# Patient Record
Sex: FEMALE | Race: BLACK OR AFRICAN AMERICAN | NOT HISPANIC OR LATINO | Employment: FULL TIME | URBAN - METROPOLITAN AREA
[De-identification: names, ages, dates, MRNs, and addresses within clinical notes are randomized per-mention and may not be internally consistent; named-entity substitution may affect disease eponyms.]

---

## 2017-05-29 PROBLEM — I34.0 MITRAL VALVE INSUFFICIENCY: Status: ACTIVE | Noted: 2017-05-29

## 2017-05-29 PROBLEM — I87.2 CHRONIC VENOUS INSUFFICIENCY: Status: ACTIVE | Noted: 2017-05-29

## 2017-05-29 PROBLEM — I27.20 PULMONARY HTN: Status: RESOLVED | Noted: 2017-05-29 | Resolved: 2017-05-29

## 2017-05-29 PROBLEM — I27.20 PULMONARY HTN: Status: ACTIVE | Noted: 2017-05-29

## 2017-05-29 PROBLEM — I07.1 TRICUSPID VALVE INSUFFICIENCY: Status: ACTIVE | Noted: 2017-05-29

## 2017-05-29 PROBLEM — I42.9 CARDIOMYOPATHY: Status: ACTIVE | Noted: 2017-05-29

## 2018-07-30 PROBLEM — I50.30 HEART FAILURE WITH PRESERVED EJECTION FRACTION, BORDERLINE, CLASS II: Status: RESOLVED | Noted: 2018-07-30 | Resolved: 2018-07-30

## 2018-07-30 PROBLEM — I50.30 HEART FAILURE WITH PRESERVED EJECTION FRACTION, BORDERLINE, CLASS II: Status: ACTIVE | Noted: 2018-07-30

## 2018-12-12 PROCEDURE — 88313 SPECIAL STAINS GROUP 2: CPT | Performed by: PATHOLOGY

## 2018-12-12 PROCEDURE — 88342 IMHCHEM/IMCYTCHM 1ST ANTB: CPT | Performed by: PATHOLOGY

## 2019-09-30 PROBLEM — Z79.4 TYPE 2 DIABETES MELLITUS WITHOUT COMPLICATION, WITH LONG-TERM CURRENT USE OF INSULIN: Status: ACTIVE | Noted: 2019-09-30

## 2019-09-30 PROBLEM — T46.6X5A STATIN MYOPATHY: Status: ACTIVE | Noted: 2019-09-30

## 2019-09-30 PROBLEM — Z95.820 S/P PERIPHERAL ARTERY ANGIOPLASTY WITH STENT PLACEMENT: Status: ACTIVE | Noted: 2019-09-30

## 2019-09-30 PROBLEM — G72.0 STATIN MYOPATHY: Status: ACTIVE | Noted: 2019-09-30

## 2019-09-30 PROBLEM — I73.9 PAD (PERIPHERAL ARTERY DISEASE): Status: ACTIVE | Noted: 2019-09-30

## 2019-09-30 PROBLEM — E11.9 TYPE 2 DIABETES MELLITUS WITHOUT COMPLICATION, WITH LONG-TERM CURRENT USE OF INSULIN: Status: ACTIVE | Noted: 2019-09-30

## 2020-03-17 PROBLEM — I37.1 PULMONARY VALVE INSUFFICIENCY: Status: ACTIVE | Noted: 2020-03-17

## 2020-03-18 PROBLEM — I07.1 TRICUSPID VALVE INSUFFICIENCY: Status: RESOLVED | Noted: 2017-05-29 | Resolved: 2020-03-18

## 2020-03-18 PROBLEM — I34.0 MITRAL VALVE INSUFFICIENCY: Status: RESOLVED | Noted: 2017-05-29 | Resolved: 2020-03-18

## 2020-03-18 PROBLEM — I34.0 NONRHEUMATIC MITRAL VALVE REGURGITATION: Status: ACTIVE | Noted: 2020-03-18

## 2021-05-06 ENCOUNTER — PATIENT MESSAGE (OUTPATIENT)
Dept: RESEARCH | Facility: HOSPITAL | Age: 51
End: 2021-05-06

## 2021-05-21 PROBLEM — Z12.11 ENCOUNTER FOR COLORECTAL CANCER SCREENING: Status: ACTIVE | Noted: 2021-05-21

## 2021-05-21 PROBLEM — Z12.12 ENCOUNTER FOR COLORECTAL CANCER SCREENING: Status: ACTIVE | Noted: 2021-05-21

## 2021-06-16 ENCOUNTER — HOSPITAL ENCOUNTER (EMERGENCY)
Facility: HOSPITAL | Age: 51
Discharge: HOME | End: 2021-06-16
Attending: EMERGENCY MEDICINE
Payer: MEDICAID

## 2021-06-16 VITALS
OXYGEN SATURATION: 97 % | WEIGHT: 250 LBS | HEART RATE: 74 BPM | TEMPERATURE: 98 F | SYSTOLIC BLOOD PRESSURE: 119 MMHG | HEIGHT: 62 IN | RESPIRATION RATE: 18 BRPM | DIASTOLIC BLOOD PRESSURE: 54 MMHG | BODY MASS INDEX: 46.01 KG/M2
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
## 2021-06-16 PROCEDURE — 99284 EMERGENCY DEPT VISIT MOD MDM: CPT | Mod: 25

## 2021-06-16 RX ORDER — METHOCARBAMOL 500 MG/1
1000 TABLET, FILM COATED ORAL 3 TIMES DAILY
Qty: 30 TABLET | Refills: 0 | Status: SHIPPED | OUTPATIENT
Start: 2021-06-16 | End: 2021-06-21

## 2022-06-15 PROBLEM — R06.09 DYSPNEA ON EXERTION: Status: ACTIVE | Noted: 2022-06-15

## 2022-06-15 PROBLEM — Z01.818 PRE-OP EVALUATION: Status: ACTIVE | Noted: 2022-06-15

## 2022-06-15 PROBLEM — E66.01 CLASS 3 SEVERE OBESITY DUE TO EXCESS CALORIES WITHOUT SERIOUS COMORBIDITY WITH BODY MASS INDEX (BMI) OF 45.0 TO 49.9 IN ADULT: Status: ACTIVE | Noted: 2022-06-15

## 2022-06-15 PROBLEM — E66.813 CLASS 3 SEVERE OBESITY DUE TO EXCESS CALORIES WITHOUT SERIOUS COMORBIDITY WITH BODY MASS INDEX (BMI) OF 45.0 TO 49.9 IN ADULT: Status: ACTIVE | Noted: 2022-06-15

## 2023-01-25 ENCOUNTER — HOSPITAL ENCOUNTER (EMERGENCY)
Facility: HOSPITAL | Age: 53
Discharge: HOME | End: 2023-01-25
Attending: EMERGENCY MEDICINE
Payer: MEDICAID

## 2023-01-25 VITALS
RESPIRATION RATE: 20 BRPM | WEIGHT: 248 LBS | HEIGHT: 62 IN | OXYGEN SATURATION: 100 % | SYSTOLIC BLOOD PRESSURE: 127 MMHG | DIASTOLIC BLOOD PRESSURE: 84 MMHG | BODY MASS INDEX: 45.64 KG/M2 | TEMPERATURE: 98 F | HEART RATE: 76 BPM

## 2023-01-25 DIAGNOSIS — R07.9 CHEST PAIN: ICD-10-CM

## 2023-01-25 LAB
ALBUMIN SERPL BCP-MCNC: 3.6 G/DL (ref 3.5–5.2)
ALP SERPL-CCNC: 89 U/L (ref 55–135)
ALT SERPL W/O P-5'-P-CCNC: 23 U/L (ref 10–44)
ANION GAP SERPL CALC-SCNC: 5 MMOL/L (ref 8–16)
AST SERPL-CCNC: 16 U/L (ref 10–40)
BASOPHILS # BLD AUTO: 0.05 K/UL (ref 0–0.2)
BASOPHILS NFR BLD: 0.7 % (ref 0–1.9)
BILIRUB SERPL-MCNC: 0.2 MG/DL (ref 0.1–1)
BUN SERPL-MCNC: 17 MG/DL (ref 6–20)
CALCIUM SERPL-MCNC: 9.3 MG/DL (ref 8.7–10.5)
CHLORIDE SERPL-SCNC: 107 MMOL/L (ref 95–110)
CO2 SERPL-SCNC: 30 MMOL/L (ref 23–29)
CREAT SERPL-MCNC: 1 MG/DL (ref 0.5–1.4)
DIFFERENTIAL METHOD: ABNORMAL
EOSINOPHIL # BLD AUTO: 0.2 K/UL (ref 0–0.5)
EOSINOPHIL NFR BLD: 2.1 % (ref 0–8)
ERYTHROCYTE [DISTWIDTH] IN BLOOD BY AUTOMATED COUNT: 13.7 % (ref 11.5–14.5)
EST. GFR  (NO RACE VARIABLE): >60 ML/MIN/1.73 M^2
GLUCOSE SERPL-MCNC: 130 MG/DL (ref 70–110)
HCT VFR BLD AUTO: 35.8 % (ref 37–48.5)
HGB BLD-MCNC: 11.9 G/DL (ref 12–16)
IMM GRANULOCYTES # BLD AUTO: 0.03 K/UL (ref 0–0.04)
IMM GRANULOCYTES NFR BLD AUTO: 0.4 % (ref 0–0.5)
LYMPHOCYTES # BLD AUTO: 2.9 K/UL (ref 1–4.8)
LYMPHOCYTES NFR BLD: 39.5 % (ref 18–48)
MCH RBC QN AUTO: 28.2 PG (ref 27–31)
MCHC RBC AUTO-ENTMCNC: 33.2 G/DL (ref 32–36)
MCV RBC AUTO: 85 FL (ref 82–98)
MONOCYTES # BLD AUTO: 0.6 K/UL (ref 0.3–1)
MONOCYTES NFR BLD: 8.5 % (ref 4–15)
NEUTROPHILS # BLD AUTO: 3.6 K/UL (ref 1.8–7.7)
NEUTROPHILS NFR BLD: 48.8 % (ref 38–73)
NRBC BLD-RTO: 0 /100 WBC
NT-PROBNP SERPL-MCNC: 16 PG/ML (ref 5–900)
PLATELET # BLD AUTO: 247 K/UL (ref 150–450)
PMV BLD AUTO: 9.8 FL (ref 9.2–12.9)
POTASSIUM SERPL-SCNC: 4 MMOL/L (ref 3.5–5.1)
PROT SERPL-MCNC: 7.6 G/DL (ref 6–8.4)
RBC # BLD AUTO: 4.22 M/UL (ref 4–5.4)
SODIUM SERPL-SCNC: 142 MMOL/L (ref 136–145)
TROPONIN I SERPL DL<=0.01 NG/ML-MCNC: <4 PG/ML (ref 0–60)
TROPONIN I SERPL DL<=0.01 NG/ML-MCNC: <4 PG/ML (ref 0–60)
WBC # BLD AUTO: 7.45 K/UL (ref 3.9–12.7)

## 2023-01-25 PROCEDURE — 93005 ELECTROCARDIOGRAM TRACING: CPT

## 2023-01-25 PROCEDURE — 99285 EMERGENCY DEPT VISIT HI MDM: CPT | Mod: 25

## 2023-01-25 PROCEDURE — 93010 ELECTROCARDIOGRAM REPORT: CPT | Mod: ,,, | Performed by: INTERNAL MEDICINE

## 2023-01-25 PROCEDURE — 85025 COMPLETE CBC W/AUTO DIFF WBC: CPT

## 2023-01-25 PROCEDURE — 84484 ASSAY OF TROPONIN QUANT: CPT

## 2023-01-25 PROCEDURE — 83880 ASSAY OF NATRIURETIC PEPTIDE: CPT

## 2023-01-25 PROCEDURE — 25000003 PHARM REV CODE 250

## 2023-01-25 PROCEDURE — 80053 COMPREHEN METABOLIC PANEL: CPT

## 2023-01-25 PROCEDURE — 36415 COLL VENOUS BLD VENIPUNCTURE: CPT

## 2023-01-25 PROCEDURE — 93010 EKG 12-LEAD: ICD-10-PCS | Mod: ,,, | Performed by: INTERNAL MEDICINE

## 2023-01-25 RX ORDER — NAPROXEN SODIUM 220 MG/1
162 TABLET, FILM COATED ORAL
Status: COMPLETED | OUTPATIENT
Start: 2023-01-25 | End: 2023-01-25

## 2023-01-25 RX ORDER — NAPROXEN SODIUM 220 MG/1
324 TABLET, FILM COATED ORAL
Status: DISCONTINUED | OUTPATIENT
Start: 2023-01-25 | End: 2023-01-25

## 2023-01-25 RX ADMIN — ASPIRIN 81 MG CHEWABLE TABLET 162 MG: 81 TABLET CHEWABLE at 11:01

## 2023-01-25 NOTE — DISCHARGE INSTRUCTIONS
You were seen today for your chest pain.  Your chest x-ray, EKG, blood work were all normal.  I am unsure what exactly causing your chest pain, however do not believe her having an acute cardiac event.  Please follow up with your primary care provider if her symptoms do not improve.  You can try taking Tylenol or ibuprofen as needed for pain.

## 2023-01-25 NOTE — ED PROVIDER NOTES
Encounter Date: 1/25/2023       History     Chief Complaint   Patient presents with    Shortness of Breath     Complains of intermittent sob, neck pain, and chest wall pain-reproducible upon palpation.  Onset 1 hr ago.      52-year-old female to ED with complaints of intermittent shortness of breath, neck pain, chest pain that started approximately 1 hour prior to arrival.  She was not taken any medication or treatment prior to arrival.  Midsternal chest pain radiated into the right side of her neck.    The history is provided by the patient.   Review of patient's allergies indicates:   Allergen Reactions    Codeine Itching    Hydrocodone-guaifenesin Itching     Past Medical History:   Diagnosis Date    Acid reflux     Asthma     Carpal tunnel syndrome     Connective tissue disease     GERD (gastroesophageal reflux disease)     High cholesterol      Past Surgical History:   Procedure Laterality Date    COLONOSCOPY N/A 5/21/2021    Procedure: COLONOSCOPY;  Surgeon: Lashon Giraldo MD;  Location: [facility];  Service: Endoscopy;  Laterality: N/A;  Rapid on arrival    ROTATOR CUFF REPAIR Right     stent left leg      TUBAL LIGATION       Family History   Problem Relation Age of Onset    Heart attack Mother     Heart attack Father     Stroke Father     Diabetes Father     No Known Problems Paternal Grandmother     No Known Problems Paternal Grandfather     No Known Problems Maternal Grandmother     No Known Problems Maternal Grandfather      Social History     Tobacco Use    Smoking status: Never    Smokeless tobacco: Never   Substance Use Topics    Alcohol use: No    Drug use: No     Review of Systems   Constitutional:  Negative for fatigue and fever.   HENT:  Negative for congestion.    Eyes: Negative.    Respiratory:  Positive for shortness of breath. Negative for cough.    Cardiovascular:  Positive for chest pain. Negative for palpitations.   Gastrointestinal:  Negative for abdominal pain, nausea and vomiting.    Endocrine: Negative.    Genitourinary:  Negative for difficulty urinating.   Musculoskeletal:  Positive for neck pain. Negative for back pain and myalgias.   Allergic/Immunologic: Negative.    Neurological:  Negative for dizziness and headaches.   Hematological: Negative.    Psychiatric/Behavioral: Negative.       Physical Exam     Initial Vitals [01/25/23 1041]   BP Pulse Resp Temp SpO2   127/84 76 20 97.5 °F (36.4 °C) 100 %      MAP       --         Physical Exam    Nursing note and vitals reviewed.  Constitutional: She appears well-developed and well-nourished.   HENT:   Head: Normocephalic and atraumatic.   Eyes: EOM are normal.   Neck: Neck supple.   Normal range of motion.  Cardiovascular:  Normal rate and regular rhythm.           Pulmonary/Chest: Breath sounds normal. No respiratory distress. She has no wheezes. She exhibits tenderness.   Abdominal: She exhibits no distension.   Musculoskeletal:         General: Normal range of motion.      Cervical back: Normal range of motion and neck supple.     Neurological: She is alert and oriented to person, place, and time.   Skin: Skin is warm and dry.   Psychiatric: She has a normal mood and affect. Thought content normal.       ED Course   Procedures  Labs Reviewed   CBC W/ AUTO DIFFERENTIAL - Abnormal; Notable for the following components:       Result Value    Hemoglobin 11.9 (*)     Hematocrit 35.8 (*)     All other components within normal limits   COMPREHENSIVE METABOLIC PANEL - Abnormal; Notable for the following components:    CO2 30 (*)     Glucose 130 (*)     Anion Gap 5 (*)     All other components within normal limits   TROPONIN I HIGH SENSITIVITY   NT-PRO NATRIURETIC PEPTIDE   TROPONIN I HIGH SENSITIVITY     EKG Readings: (Independently Interpreted)   Initial Reading: No STEMI. Previous EKG: Compared with most recent EKG Rhythm: Normal Sinus Rhythm. Heart Rate: 70. Ectopy: No Ectopy. Conduction: Normal. Axis: Normal.   ECG Results              EKG  12-lead (Final result)  Result time 01/25/23 11:18:16      Final result by Interface, Lab In [facility] (01/25/23 11:18:16)                   Narrative:    Test Reason : R07.9,    Vent. Rate : 070 BPM     Atrial Rate : 070 BPM     P-R Int : 182 ms          QRS Dur : 080 ms      QT Int : 398 ms       P-R-T Axes : 034 018 024 degrees     QTc Int : 429 ms    Normal sinus rhythm  Normal ECG  When compared with ECG of 15-JONATHAN-2022 13:56,  No significant change was found  Confirmed by Lissa Aleman MD ([id number]) on 1/25/2023 11:18:04 AM    Referred By: [email or web address]   SELF           Confirmed By:Lissa Aleman MD                                     EKG 12-lead (In process)  Result time 01/25/23 16:35:41      In process by Interface, Lab In [facility] (01/25/23 16:35:41)                   Narrative:    Test Reason :     Vent. Rate : 070 BPM     Atrial Rate : 070 BPM     P-R Int : 182 ms          QRS Dur : 080 ms      QT Int : 398 ms       P-R-T Axes : 034 018 024 degrees     QTc Int : 429 ms    Normal sinus rhythm  Normal ECG  When compared with ECG of 15-JONATHAN-2022 13:56,  No significant change was found    Referred By: [email or web address]   SELF           Confirmed By:                                   Imaging Results              X-Ray Chest PA And Lateral (Final result)  Result time 01/25/23 11:14:51      Final result by Elen Lucio MD (01/25/23 11:14:51)                   Impression:      No acute lung disease      Electronically signed by: Elen Lucio MD  Date:    01/25/2023  Time:    11:14               Narrative:    EXAMINATION:  XR CHEST PA AND LATERAL    CLINICAL HISTORY:  Chest pain, unspecified    COMPARISON:  06/16/2021    FINDINGS:  No acute infiltrates or effusions                                    X-Rays:   Independently Interpreted Readings:   Chest X-Ray: Normal heart size.  No infiltrates.  No acute abnormalities.   Medications   aspirin chewable tablet 162 mg (162 mg Oral Given 1/25/23 1100)     Medical Decision Making:    History:   Old Medical Records: I decided to obtain old medical records.  Clinical Tests:   Lab Tests: Ordered and Reviewed  Radiological Study: Ordered and Reviewed  52-year-old female to ED with the above complaints.  She was past medical history of acid reflux and hypercholesterolemia.  Her symptoms were sudden onset and started 1 hour prior to arrival while she was at work.  She reports some intermittent shortness a breath.  I obtained a CBC, CMP, troponin, EKG and chest x-ray.  She was given 162 mg of aspirin, as she took 162 mg of aspirin prior to arrival.  CBC was significant for mild anemia.  CMP was negative for signs of electrolyte imbalances, kidney or liver dysfunction.  EKG was normal sinus rhythm.  Chest x-ray was unremarkable.  There were no signs of respiratory distress noted.  Her lungs are clear in all fields.  Pain was reproducible to midsternal with palpation.  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  I believe this is muscular in nature.  She was instructed to follow up with her primary care provider for further evaluation.  Additional MDM:   Heart Score:    History:          Slightly suspicious.  ECG:             Normal  Age:               45-65 years  Risk factors: 1-2 risk factors  Troponin:       Less than or equal to normal limit  Final Score: 2                       Clinical Impression:   Final diagnoses:  [R07.9] Chest pain        ED Disposition Condition    Discharge Stable          ED Prescriptions    None       Follow-up Information       Follow up With Specialties Details Why Contact Info    Joseph Scott MD Internal Medicine In 2 days  [facility] [facility] [address]  [phone]               Car Guadarrama NP  01/25/23 2039

## 2023-02-27 ENCOUNTER — HOSPITAL ENCOUNTER (EMERGENCY)
Facility: HOSPITAL | Age: 53
Discharge: HOME | End: 2023-02-27
Attending: EMERGENCY MEDICINE
Payer: MEDICAID

## 2023-02-27 VITALS
DIASTOLIC BLOOD PRESSURE: 74 MMHG | OXYGEN SATURATION: 97 % | HEIGHT: 62 IN | TEMPERATURE: 98 F | BODY MASS INDEX: 44.16 KG/M2 | SYSTOLIC BLOOD PRESSURE: 118 MMHG | HEART RATE: 67 BPM | RESPIRATION RATE: 18 BRPM | WEIGHT: 240 LBS

## 2023-02-27 DIAGNOSIS — R07.9 CHEST PAIN: Primary | ICD-10-CM

## 2023-02-27 LAB
ALBUMIN SERPL BCP-MCNC: 3.7 G/DL (ref 3.5–5.2)
ALP SERPL-CCNC: 87 U/L (ref 55–135)
ALT SERPL W/O P-5'-P-CCNC: 24 U/L (ref 10–44)
ANION GAP SERPL CALC-SCNC: 5 MMOL/L (ref 8–16)
AST SERPL-CCNC: 14 U/L (ref 10–40)
BASOPHILS # BLD AUTO: 0.06 K/UL (ref 0–0.2)
BASOPHILS NFR BLD: 0.7 % (ref 0–1.9)
BILIRUB SERPL-MCNC: 0.2 MG/DL (ref 0.1–1)
BUN SERPL-MCNC: 18 MG/DL (ref 6–20)
CALCIUM SERPL-MCNC: 9 MG/DL (ref 8.7–10.5)
CHLORIDE SERPL-SCNC: 108 MMOL/L (ref 95–110)
CO2 SERPL-SCNC: 27 MMOL/L (ref 23–29)
CREAT SERPL-MCNC: 1 MG/DL (ref 0.5–1.4)
DIFFERENTIAL METHOD: ABNORMAL
EOSINOPHIL # BLD AUTO: 0.2 K/UL (ref 0–0.5)
EOSINOPHIL NFR BLD: 1.8 % (ref 0–8)
ERYTHROCYTE [DISTWIDTH] IN BLOOD BY AUTOMATED COUNT: 14 % (ref 11.5–14.5)
EST. GFR  (NO RACE VARIABLE): >60 ML/MIN/1.73 M^2
GLUCOSE SERPL-MCNC: 112 MG/DL (ref 70–110)
HCT VFR BLD AUTO: 36.9 % (ref 37–48.5)
HGB BLD-MCNC: 11.8 G/DL (ref 12–16)
IMM GRANULOCYTES # BLD AUTO: 0.06 K/UL (ref 0–0.04)
IMM GRANULOCYTES NFR BLD AUTO: 0.7 % (ref 0–0.5)
LIPASE SERPL-CCNC: 151 U/L (ref 23–300)
LYMPHOCYTES # BLD AUTO: 4.4 K/UL (ref 1–4.8)
LYMPHOCYTES NFR BLD: 50.3 % (ref 18–48)
MCH RBC QN AUTO: 28.2 PG (ref 27–31)
MCHC RBC AUTO-ENTMCNC: 32 G/DL (ref 32–36)
MCV RBC AUTO: 88 FL (ref 82–98)
MONOCYTES # BLD AUTO: 0.8 K/UL (ref 0.3–1)
MONOCYTES NFR BLD: 8.6 % (ref 4–15)
NEUTROPHILS # BLD AUTO: 3.4 K/UL (ref 1.8–7.7)
NEUTROPHILS NFR BLD: 37.9 % (ref 38–73)
NRBC BLD-RTO: 0 /100 WBC
PLATELET # BLD AUTO: 265 K/UL (ref 150–450)
PMV BLD AUTO: 10.3 FL (ref 9.2–12.9)
POTASSIUM SERPL-SCNC: 4 MMOL/L (ref 3.5–5.1)
PROT SERPL-MCNC: 7.7 G/DL (ref 6–8.4)
RBC # BLD AUTO: 4.18 M/UL (ref 4–5.4)
SODIUM SERPL-SCNC: 140 MMOL/L (ref 136–145)
TROPONIN I SERPL DL<=0.01 NG/ML-MCNC: 4.1 PG/ML (ref 0–60)
TROPONIN I SERPL DL<=0.01 NG/ML-MCNC: 5.1 PG/ML (ref 0–60)
WBC # BLD AUTO: 8.83 K/UL (ref 3.9–12.7)

## 2023-02-27 PROCEDURE — 80053 COMPREHEN METABOLIC PANEL: CPT | Performed by: EMERGENCY MEDICINE

## 2023-02-27 PROCEDURE — 85025 COMPLETE CBC W/AUTO DIFF WBC: CPT | Performed by: EMERGENCY MEDICINE

## 2023-02-27 PROCEDURE — 83690 ASSAY OF LIPASE: CPT | Performed by: EMERGENCY MEDICINE

## 2023-02-27 PROCEDURE — 84484 ASSAY OF TROPONIN QUANT: CPT | Mod: 91 | Performed by: EMERGENCY MEDICINE

## 2023-02-27 PROCEDURE — 36415 COLL VENOUS BLD VENIPUNCTURE: CPT | Performed by: EMERGENCY MEDICINE

## 2023-02-27 PROCEDURE — 93010 EKG 12-LEAD: ICD-10-PCS | Mod: ,,, | Performed by: INTERNAL MEDICINE

## 2023-02-27 PROCEDURE — 93010 ELECTROCARDIOGRAM REPORT: CPT | Mod: ,,, | Performed by: INTERNAL MEDICINE

## 2023-02-27 PROCEDURE — 99285 EMERGENCY DEPT VISIT HI MDM: CPT | Mod: 25

## 2023-02-27 PROCEDURE — 93005 ELECTROCARDIOGRAM TRACING: CPT

## 2023-02-27 NOTE — ED PROVIDER NOTES
EMERGENCY DEPARTMENT HISTORY AND PHYSICAL EXAM     This note is dictated on M*Modal word recognition program.  There are word recognition mistakes and grammatical errors that are occasionally missed on review.     Date: 2/27/2023   Patient Name: Ting Ortega       History of Presenting Illness      Chief Complaint   Patient presents with    Chest Pain     Patient reports intermittent midsternal chest pain with left arm numbness x 30 min PTA.         0614   Ting Ortega is a 52 y.o. female with PMHX of hypertension, hyperlipidemia, type 2 diabetes, venous insufficiency, MVR, obesity who presents to the emergency department C/O chest pain.      Patient says that she went to work today at [employer] and says that shortly after arriving at work started developing left parasternal chest pain.  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  She reports she felt like her left arm was heavy and her face was flushed.  She told a co-worker that she was having chest pain and drank some cola.  She states that chest pain was sharp lasted few minutes but is unsure how long.  She reports no prior episodes.  She states she was seen in ED few weeks ago with chest pain however that chest pain was on the right parasternal at that time.  She denies shortness of breath, cough, fever, hemoptysis, leg swelling.    She is followed by Dr Tirado at [facility] cardiology for PAD/venous insufficieny.           PCP: Joseph Scott MD        No current facility-administered medications for this encounter.     Current Outpatient Medications   Medication Sig Dispense Refill    aspirin (ECOTRIN) 81 MG EC tablet Take 81 mg by mouth.      atorvastatin (LIPITOR) 80 MG tablet Take 1 tablet (80 mg total) by mouth once daily. 90 tablet 3    clobetasol (TEMOVATE) 0.05 % external solution nightly.      clopidogreL (PLAVIX) 75 mg tablet Take 1 tablet by mouth once daily 90 tablet 0    ezetimibe (ZETIA) 10 mg tablet Take 1 tablet by mouth  once daily 30 tablet 0    furosemide (LASIX) 40 MG tablet Take 1 tablet (40 mg total) by mouth once daily. 90 tablet 3    gabapentin (NEURONTIN) 300 MG capsule Take 300 mg by mouth 3 (three) times daily. Make pt drowsy so she does not take      hydroCHLOROthiazide (MICROZIDE) 12.5 mg capsule Take 1 capsule by mouth once daily 30 capsule 0    metFORMIN (GLUCOPHAGE) 500 MG tablet Take 500 mg by mouth 2 (two) times daily with meals.      ONETOUCH DELICA PLUS LANCET 30 gauge Misc USE 1 TO CHECK GLUCOSE ONCE DAILY  99    ONETOUCH ULTRA BLUE TEST STRIP Strp USE TO CHECK BLOOD SUGAR DAILY AND AS NEEDED  99    ONETOUCH ULTRA2 kit   0           Past History     Past Medical History:   Past Medical History:   Diagnosis Date    Acid reflux     Asthma     Carpal tunnel syndrome     Connective tissue disease     GERD (gastroesophageal reflux disease)     High cholesterol         Past Surgical History:   Past Surgical History:   Procedure Laterality Date    COLONOSCOPY N/A 5/21/2021    Procedure: COLONOSCOPY;  Surgeon: Lashon Giraldo MD;  [facility];  Service: Endoscopy;  Laterality: N/A;  Rapid on arrival    ROTATOR CUFF REPAIR Right     stent left leg      TUBAL LIGATION          Family History:   Family History   Problem Relation Age of Onset    Heart attack Mother     Heart attack Father     Stroke Father     Diabetes Father     No Known Problems Paternal Grandmother     No Known Problems Paternal Grandfather     No Known Problems Maternal Grandmother     No Known Problems Maternal Grandfather         Social History:   Social History     Tobacco Use    Smoking status: Never    Smokeless tobacco: Never   Substance Use Topics    Alcohol use: No    Drug use: No        Allergies:   Review of patient's allergies indicates:   Allergen Reactions    Codeine Itching    Hydrocodone-guaifenesin Itching          Review of Systems   Review of Systems   See HPI for pertinent positives and negatives       Physical Exam  "    Vitals:    02/27/23 0602 02/27/23 0700   BP: (!) 152/80 (!) 151/72   Pulse: 67 69   Resp: 17 16   Temp: 97.9 °F (36.6 °C)    SpO2: 98% 99%   Weight: 108.9 kg (240 lb)    Height: 5' 2" (1.575 m)       Physical Exam  Vitals and nursing note reviewed.   Constitutional:       General: She is not in acute distress.     Appearance: Normal appearance. She is well-developed. She is obese. She is not ill-appearing.   HENT:      Head: Normocephalic and atraumatic.      Right Ear: External ear normal.      Left Ear: External ear normal.      Nose: Nose normal. No congestion or rhinorrhea.      Mouth/Throat:      Mouth: Mucous membranes are moist.   Eyes:      Conjunctiva/sclera: Conjunctivae normal.      Pupils: Pupils are equal, round, and reactive to light.   Cardiovascular:      Rate and Rhythm: Normal rate and regular rhythm.      Heart sounds: Normal heart sounds.   Pulmonary:      Effort: Pulmonary effort is normal. No respiratory distress.      Breath sounds: Normal breath sounds. No decreased breath sounds or wheezing.   Chest:      Chest wall: Tenderness present.       Abdominal:      Palpations: Abdomen is soft.   Musculoskeletal:         General: No deformity. Normal range of motion.      Cervical back: Normal range of motion. No rigidity.      Right lower leg: No tenderness. No edema.      Left lower leg: No tenderness. No edema.   Skin:     General: Skin is warm and dry.   Neurological:      General: No focal deficit present.      Mental Status: She is alert and oriented to person, place, and time. Mental status is at baseline.   Psychiatric:         Mood and Affect: Mood normal.         Behavior: Behavior normal.            Diagnostic Study Results      Labs -   Recent Results (from the past 12 hour(s))   CBC Auto Differential    Collection Time: 02/27/23  6:12 AM   Result Value Ref Range    WBC 8.83 3.90 - 12.70 K/uL    RBC 4.18 4.00 - 5.40 M/uL    Hemoglobin 11.8 (L) 12.0 - 16.0 g/dL    Hematocrit 36.9 (L) " 37.0 - 48.5 %    MCV 88 82 - 98 fL    MCH 28.2 27.0 - 31.0 pg    MCHC 32.0 32.0 - 36.0 g/dL    RDW 14.0 11.5 - 14.5 %    Platelets 265 150 - 450 K/uL    MPV 10.3 9.2 - 12.9 fL    Immature Granulocytes 0.7 (H) 0.0 - 0.5 %    Gran # (ANC) 3.4 1.8 - 7.7 K/uL    Immature Grans (Abs) 0.06 (H) 0.00 - 0.04 K/uL    Lymph # 4.4 1.0 - 4.8 K/uL    Mono # 0.8 0.3 - 1.0 K/uL    Eos # 0.2 0.0 - 0.5 K/uL    Baso # 0.06 0.00 - 0.20 K/uL    nRBC 0 0 /100 WBC    Gran % 37.9 (L) 38.0 - 73.0 %    Lymph % 50.3 (H) 18.0 - 48.0 %    Mono % 8.6 4.0 - 15.0 %    Eosinophil % 1.8 0.0 - 8.0 %    Basophil % 0.7 0.0 - 1.9 %    Differential Method Automated    Comprehensive Metabolic Panel    Collection Time: 02/27/23  6:12 AM   Result Value Ref Range    Sodium 140 136 - 145 mmol/L    Potassium 4.0 3.5 - 5.1 mmol/L    Chloride 108 95 - 110 mmol/L    CO2 27 23 - 29 mmol/L    Glucose 112 (H) 70 - 110 mg/dL    BUN 18 6 - 20 mg/dL    Creatinine 1.0 0.5 - 1.4 mg/dL    Calcium 9.0 8.7 - 10.5 mg/dL    Total Protein 7.7 6.0 - 8.4 g/dL    Albumin 3.7 3.5 - 5.2 g/dL    Total Bilirubin 0.2 0.1 - 1.0 mg/dL    Alkaline Phosphatase 87 55 - 135 U/L    AST 14 10 - 40 U/L    ALT 24 10 - 44 U/L    Anion Gap 5 (L) 8 - 16 mmol/L    eGFR >60.0 >60 mL/min/1.73 m^2   Lipase    Collection Time: 02/27/23  6:12 AM   Result Value Ref Range    Lipase Result 151 23 - 300 U/L   TROPONIN I HIGH SENSITIVITY    Collection Time: 02/27/23  6:12 AM   Result Value Ref Range    Troponin I High Sensitivity 4.1 0.0 - 60.0 pg/mL   TROPONIN I HIGH SENSITIVITY    Collection Time: 02/27/23  8:26 AM   Result Value Ref Range    Troponin I High Sensitivity 5.1 0.0 - 60.0 pg/mL        Radiologic Studies -    X-Ray Chest 1 View    (Results Pending)        Medications given in the ED-   Medications - No data to display        Medical Decision Making    I am the first provider for this patient.     I reviewed the vital signs, available nursing notes, past medical history, past surgical history,  family history and social history.     Vital Signs:  Reviewed the patient's vital signs.     Pulse Oximetry Analysis and Interpretation:  98% on Room Air, normal      EKG Interpretation: (Per my independent interpretation, pending formal read)   Interpreted by Jett Villa MD at 0616   Normal sinus rhythm rate of 71 normal intervals normal axis normal EKG     CXR  Interpretation: (Per my independent interpretation, pending formal read)   CXR read by Dr. Jett Villa at 0800    No acute findings in chest, cardiac silhouette appears normal, lung fields clear     External Test Results (Pertinent to encounter):    6/15 ECHO  Summary    The left ventricle is normal in size with normal systolic function.  The estimated ejection fraction is 60%.  The quantitatively derived ejection fraction is 66%.  Normal left ventricular diastolic function.  Normal right ventricular size with normal right ventricular systolic function.  Mild aortic regurgitation.  Mild mitral regurgitation.  The estimated PA systolic pressure is 33 mmHg.  There is no pulmonary hypertension.  Mild tricuspid regurgitation.  Intermediate central venous pressure (8 mmHg).    Records Reviewed: Nursing Notes, Current Prescription Medications, Old Medical Records, External Medical Records , Previous EKG, and Previous Radiology Studies    History Obtained By: Patient    Provider Notes: Ting Ortega is a 52 y.o. female with chest pain    Co-morbidities Considered: HLD, HTN, DM    Differential Diagnosis: ACS, Atypical chest pain      ED Course:    9:27 AM  Patient well-appearing.  Chest pain-free in ED.  Reviewed labs which were unremarkable.  Two set troponin negative.  EKG normal without any evidence of ischemia.  Heart score 3.  Discussed results with patient.  Low suspicion for ACS at this time.  Discussed with patient that in female diabetics ACS can be atypical and that if her symptoms become persistent, severe, or grossly abnormal for her she  should return to ED for re-evaluation.  I have requested she follow up with her cardiologist discussed that she may require now patient stress test for further evaluation.  Patient expressed agreement and understand this plan.  Comfortable and agreeable with discharge.  Denies complaints at this time.         Problems Addressed:  Chest Pain    Procedures:   Procedures       Diagnosis and Disposition     Critical Care:      DISCHARGE NOTE:       Ting Ortega's  results have been reviewed with her.  She has been counseled regarding her diagnosis, treatment, and plan.  She verbally conveys understanding and agreement of the signs, symptoms, diagnosis, treatment and prognosis and additionally agrees to follow up as discussed.  She also agrees with the care-plan and conveys that all of her questions have been answered.  I have also provided discharge instructions for her that include: educational information regarding their diagnosis and treatment, and list of reasons why they would want to return to the ED prior to their follow-up appointment, should her condition change. She has been provided with education for proper emergency department utilization.         CLINICAL IMPRESSION:         1. Chest pain              PLAN:   1. Discharge Home  2.      Medication List        ASK your doctor about these medications      aspirin 81 MG EC tablet  Commonly known as: ECOTRIN     atorvastatin 80 MG tablet  Commonly known as: LIPITOR  Take 1 tablet (80 mg total) by mouth once daily.     clobetasoL 0.05 % external solution  Commonly known as: TEMOVATE     clopidogreL 75 mg tablet  Commonly known as: PLAVIX  Take 1 tablet by mouth once daily     ezetimibe 10 mg tablet  Commonly known as: ZETIA  Take 1 tablet by mouth once daily     furosemide 40 MG tablet  Commonly known as: LASIX  Take 1 tablet (40 mg total) by mouth once daily.     gabapentin 300 MG capsule  Commonly known as: NEURONTIN     hydroCHLOROthiazide 12.5 mg  capsule  Commonly known as: MICROZIDE  Take 1 capsule by mouth once daily     metFORMIN 500 MG tablet  Commonly known as: GLUCOPHAGE     ONETOUCH DELICA PLUS LANCET 30 gauge Misc  Generic drug: lancets     ONETOUCH ULTRA BLUE TEST STRIP Strp  Generic drug: blood sugar diagnostic     ONETOUCH ULTRA2 METER kit  Generic drug: blood-glucose meter             3. Joseph Scott MD  [address] [facility] [address]  [phone]    Schedule an appointment as soon as possible for a visit   Primary care follow up    Jewel Fernández MD  [address] [facility] [address]  [phone]          [facility] Emergency Department  [address] [facility] [address] [phone]  Go to   If symptoms worsen       _______________________________     Please note that this dictation was completed with [employer], the computer voice recognition software.  Quite often unanticipated grammatical, syntax, homophones, and other interpretive errors are inadvertently transcribed by the computer software.  Please disregard these errors.  Please excuse any errors that have escaped final proofreading.             Jett Villa MD  02/27/23 8782

## 2023-02-27 NOTE — Clinical Note
"Ting "Trav Ortega was seen and treated in our emergency department on 2/27/2023.  She may return to work on 02/27/2023.       If you have any questions or concerns, please don't hesitate to call.      Patricia Smith RN    "

## 2023-03-27 PROBLEM — Z91.89 MULTIPLE RISK FACTORS FOR CORONARY ARTERY DISEASE: Status: ACTIVE | Noted: 2023-03-27

## 2023-03-27 PROBLEM — R07.89 OTHER CHEST PAIN: Status: ACTIVE | Noted: 2023-03-27

## 2023-03-27 PROBLEM — M79.605 PAIN IN BOTH LOWER EXTREMITIES: Status: ACTIVE | Noted: 2023-03-27

## 2023-03-27 PROBLEM — I20.89 EQUIVALENT ANGINA: Status: ACTIVE | Noted: 2023-03-27

## 2023-03-27 PROBLEM — I10 PRIMARY HYPERTENSION: Status: ACTIVE | Noted: 2023-03-27

## 2023-03-27 PROBLEM — I35.1 NONRHEUMATIC AORTIC VALVE INSUFFICIENCY: Status: ACTIVE | Noted: 2023-03-27

## 2023-03-27 PROBLEM — I36.1 NONRHEUMATIC TRICUSPID VALVE REGURGITATION: Status: ACTIVE | Noted: 2023-03-27

## 2023-03-27 PROBLEM — M79.604 PAIN IN BOTH LOWER EXTREMITIES: Status: ACTIVE | Noted: 2023-03-27

## 2023-10-12 PROBLEM — R00.2 PALPITATIONS: Status: ACTIVE | Noted: 2023-10-12

## 2023-10-12 PROBLEM — Z01.818 PRE-OP EVALUATION: Status: RESOLVED | Noted: 2022-06-15 | Resolved: 2023-10-12

## 2023-10-19 ENCOUNTER — LAB VISIT (OUTPATIENT)
Dept: LAB | Facility: HOSPITAL | Age: 53
End: 2023-10-19
Attending: INTERNAL MEDICINE
Payer: MEDICAID

## 2023-10-19 DIAGNOSIS — R07.89 OTHER CHEST PAIN: ICD-10-CM

## 2023-10-19 DIAGNOSIS — R00.2 PALPITATIONS: ICD-10-CM

## 2023-10-19 DIAGNOSIS — I20.89 EQUIVALENT ANGINA: ICD-10-CM

## 2023-10-19 DIAGNOSIS — R60.0 BILATERAL EDEMA OF LOWER EXTREMITY: ICD-10-CM

## 2023-10-19 DIAGNOSIS — I35.1 NONRHEUMATIC AORTIC VALVE INSUFFICIENCY: ICD-10-CM

## 2023-10-19 DIAGNOSIS — E66.01 CLASS 3 SEVERE OBESITY DUE TO EXCESS CALORIES WITHOUT SERIOUS COMORBIDITY WITH BODY MASS INDEX (BMI) OF 45.0 TO 49.9 IN ADULT: ICD-10-CM

## 2023-10-19 DIAGNOSIS — G72.0 STATIN MYOPATHY: ICD-10-CM

## 2023-10-19 DIAGNOSIS — Z79.4 TYPE 2 DIABETES MELLITUS WITHOUT COMPLICATION, WITH LONG-TERM CURRENT USE OF INSULIN: ICD-10-CM

## 2023-10-19 DIAGNOSIS — I34.0 NONRHEUMATIC MITRAL VALVE REGURGITATION: ICD-10-CM

## 2023-10-19 DIAGNOSIS — I36.1 NONRHEUMATIC TRICUSPID VALVE REGURGITATION: ICD-10-CM

## 2023-10-19 DIAGNOSIS — E11.9 TYPE 2 DIABETES MELLITUS WITHOUT COMPLICATION, WITH LONG-TERM CURRENT USE OF INSULIN: ICD-10-CM

## 2023-10-19 DIAGNOSIS — R06.09 DYSPNEA ON EXERTION: ICD-10-CM

## 2023-10-19 DIAGNOSIS — E78.5 HYPERLIPIDEMIA, UNSPECIFIED HYPERLIPIDEMIA TYPE: ICD-10-CM

## 2023-10-19 DIAGNOSIS — T46.6X5A STATIN MYOPATHY: ICD-10-CM

## 2023-10-19 DIAGNOSIS — I73.9 PAD (PERIPHERAL ARTERY DISEASE): ICD-10-CM

## 2023-10-19 DIAGNOSIS — I10 PRIMARY HYPERTENSION: ICD-10-CM

## 2023-10-19 DIAGNOSIS — I42.9 CARDIOMYOPATHY, UNSPECIFIED TYPE: ICD-10-CM

## 2023-10-19 DIAGNOSIS — I37.1 NONRHEUMATIC PULMONARY VALVE INSUFFICIENCY: ICD-10-CM

## 2023-10-19 DIAGNOSIS — I87.2 CHRONIC VENOUS INSUFFICIENCY: ICD-10-CM

## 2023-10-19 DIAGNOSIS — Z95.820 S/P PERIPHERAL ARTERY ANGIOPLASTY WITH STENT PLACEMENT: ICD-10-CM

## 2023-10-19 DIAGNOSIS — Z91.89 MULTIPLE RISK FACTORS FOR CORONARY ARTERY DISEASE: ICD-10-CM

## 2023-10-19 LAB
ALBUMIN SERPL BCP-MCNC: 3.8 G/DL (ref 3.5–5.2)
ALP SERPL-CCNC: 80 U/L (ref 55–135)
ALT SERPL W/O P-5'-P-CCNC: 22 U/L (ref 10–44)
ANION GAP SERPL CALC-SCNC: 0 MMOL/L (ref 3–11)
AST SERPL-CCNC: 13 U/L (ref 10–40)
BASOPHILS # BLD AUTO: 0.05 K/UL (ref 0–0.2)
BASOPHILS NFR BLD: 0.8 % (ref 0–1.9)
BILIRUB SERPL-MCNC: 0.4 MG/DL (ref 0.1–1)
BUN SERPL-MCNC: 17 MG/DL (ref 6–20)
CALCIUM SERPL-MCNC: 8.8 MG/DL (ref 8.7–10.5)
CHLORIDE SERPL-SCNC: 108 MMOL/L (ref 95–110)
CHOLEST SERPL-MCNC: 192 MG/DL (ref 120–199)
CHOLEST/HDLC SERPL: 3.8 {RATIO} (ref 2–5)
CO2 SERPL-SCNC: 34 MMOL/L (ref 23–29)
CREAT SERPL-MCNC: 1 MG/DL (ref 0.5–1.4)
DIFFERENTIAL METHOD: ABNORMAL
EOSINOPHIL # BLD AUTO: 0.2 K/UL (ref 0–0.5)
EOSINOPHIL NFR BLD: 3.5 % (ref 0–8)
ERYTHROCYTE [DISTWIDTH] IN BLOOD BY AUTOMATED COUNT: 14 % (ref 11.5–14.5)
EST. GFR  (NO RACE VARIABLE): >60 ML/MIN/1.73 M^2
ESTIMATED AVG GLUCOSE: 123 MG/DL (ref 68–131)
GLUCOSE SERPL-MCNC: 114 MG/DL (ref 70–110)
HBA1C MFR BLD: 5.9 % (ref 4–5.6)
HCT VFR BLD AUTO: 39.4 % (ref 37–48.5)
HDLC SERPL-MCNC: 50 MG/DL (ref 40–75)
HDLC SERPL: 26 % (ref 20–50)
HGB BLD-MCNC: 12.4 G/DL (ref 12–16)
IMM GRANULOCYTES # BLD AUTO: 0.02 K/UL (ref 0–0.04)
IMM GRANULOCYTES NFR BLD AUTO: 0.3 % (ref 0–0.5)
LDLC SERPL CALC-MCNC: 125.8 MG/DL (ref 63–159)
LYMPHOCYTES # BLD AUTO: 3.2 K/UL (ref 1–4.8)
LYMPHOCYTES NFR BLD: 48.4 % (ref 18–48)
MAGNESIUM SERPL-MCNC: 2.3 MG/DL (ref 1.6–2.6)
MCH RBC QN AUTO: 28.2 PG (ref 27–31)
MCHC RBC AUTO-ENTMCNC: 31.5 G/DL (ref 32–36)
MCV RBC AUTO: 90 FL (ref 82–98)
MONOCYTES # BLD AUTO: 0.6 K/UL (ref 0.3–1)
MONOCYTES NFR BLD: 8.3 % (ref 4–15)
NEUTROPHILS # BLD AUTO: 2.6 K/UL (ref 1.8–7.7)
NEUTROPHILS NFR BLD: 38.7 % (ref 38–73)
NONHDLC SERPL-MCNC: 142 MG/DL
NRBC BLD-RTO: 0 /100 WBC
PLATELET # BLD AUTO: 277 K/UL (ref 150–450)
PMV BLD AUTO: 10.4 FL (ref 9.2–12.9)
POTASSIUM SERPL-SCNC: 4.4 MMOL/L (ref 3.5–5.1)
PROT SERPL-MCNC: 7.6 G/DL (ref 6–8.4)
RBC # BLD AUTO: 4.4 M/UL (ref 4–5.4)
SODIUM SERPL-SCNC: 142 MMOL/L (ref 136–145)
TRIGL SERPL-MCNC: 81 MG/DL (ref 30–150)
TSH SERPL DL<=0.005 MIU/L-ACNC: 1.14 UIU/ML (ref 0.4–4)
URATE SERPL-MCNC: 7.1 MG/DL (ref 2.4–5.7)
WBC # BLD AUTO: 6.63 K/UL (ref 3.9–12.7)

## 2023-10-19 PROCEDURE — 83880 ASSAY OF NATRIURETIC PEPTIDE: CPT | Performed by: INTERNAL MEDICINE

## 2023-10-19 PROCEDURE — 80053 COMPREHEN METABOLIC PANEL: CPT | Performed by: INTERNAL MEDICINE

## 2023-10-19 PROCEDURE — 85025 COMPLETE CBC W/AUTO DIFF WBC: CPT | Performed by: INTERNAL MEDICINE

## 2023-10-19 PROCEDURE — 83735 ASSAY OF MAGNESIUM: CPT | Performed by: INTERNAL MEDICINE

## 2023-10-19 PROCEDURE — 84443 ASSAY THYROID STIM HORMONE: CPT | Performed by: INTERNAL MEDICINE

## 2023-10-19 PROCEDURE — 83036 HEMOGLOBIN GLYCOSYLATED A1C: CPT | Performed by: INTERNAL MEDICINE

## 2023-10-19 PROCEDURE — 84550 ASSAY OF BLOOD/URIC ACID: CPT | Performed by: INTERNAL MEDICINE

## 2023-10-19 PROCEDURE — 80061 LIPID PANEL: CPT | Performed by: INTERNAL MEDICINE

## 2023-10-19 PROCEDURE — 36415 COLL VENOUS BLD VENIPUNCTURE: CPT | Performed by: INTERNAL MEDICINE

## 2023-10-20 NOTE — PROGRESS NOTES
LDL currently not at goal while on Lipitor 80 mg as well as Zetia 10 mg. Will add Praluent 300 mg at this time. Please inform the patient of the changes I am making today. Rx will be sent to [facility].

## 2023-10-26 PROBLEM — I10 PRIMARY HYPERTENSION: Chronic | Status: ACTIVE | Noted: 2023-03-27

## 2023-12-07 ENCOUNTER — PATIENT MESSAGE (OUTPATIENT)
Dept: ADMINISTRATIVE | Facility: OTHER | Age: 53
End: 2023-12-07

## 2024-04-15 PROBLEM — R60.0 EDEMA OF LEFT LOWER EXTREMITY: Status: ACTIVE | Noted: 2024-04-15

## 2024-05-23 ENCOUNTER — OFFICE VISIT (OUTPATIENT)
Dept: CARDIOLOGY | Facility: CLINIC | Age: 54
End: 2024-05-23
Payer: MEDICAID

## 2024-05-23 VITALS
WEIGHT: 240.94 LBS | HEIGHT: 62 IN | DIASTOLIC BLOOD PRESSURE: 65 MMHG | HEART RATE: 60 BPM | BODY MASS INDEX: 44.34 KG/M2 | SYSTOLIC BLOOD PRESSURE: 134 MMHG

## 2024-05-23 DIAGNOSIS — R60.0 EDEMA OF LEFT LOWER EXTREMITY: Primary | ICD-10-CM

## 2024-05-23 DIAGNOSIS — R60.0 BILATERAL EDEMA OF LOWER EXTREMITY: ICD-10-CM

## 2024-05-23 DIAGNOSIS — Z95.820 S/P PERIPHERAL ARTERY ANGIOPLASTY WITH STENT PLACEMENT: ICD-10-CM

## 2024-05-23 DIAGNOSIS — I87.2 CHRONIC VENOUS INSUFFICIENCY: ICD-10-CM

## 2024-05-23 DIAGNOSIS — E78.2 MIXED HYPERLIPIDEMIA: ICD-10-CM

## 2024-05-23 DIAGNOSIS — G72.0 STATIN MYOPATHY: ICD-10-CM

## 2024-05-23 DIAGNOSIS — I89.0 LYMPHEDEMA OF BOTH LOWER EXTREMITIES: ICD-10-CM

## 2024-05-23 DIAGNOSIS — Z79.4 TYPE 2 DIABETES MELLITUS WITHOUT COMPLICATION, WITH LONG-TERM CURRENT USE OF INSULIN: ICD-10-CM

## 2024-05-23 DIAGNOSIS — T46.6X5A STATIN MYOPATHY: ICD-10-CM

## 2024-05-23 DIAGNOSIS — E11.9 TYPE 2 DIABETES MELLITUS WITHOUT COMPLICATION, WITH LONG-TERM CURRENT USE OF INSULIN: ICD-10-CM

## 2024-05-23 DIAGNOSIS — R10.32 LEFT LOWER QUADRANT PAIN: ICD-10-CM

## 2024-05-23 PROCEDURE — 99999 PR PBB SHADOW E&M-EST. PATIENT-LVL V: CPT | Mod: PBBFAC,,, | Performed by: INTERNAL MEDICINE

## 2024-05-23 PROCEDURE — 99205 OFFICE O/P NEW HI 60 MIN: CPT | Mod: S$PBB,,, | Performed by: INTERNAL MEDICINE

## 2024-05-23 PROCEDURE — 3075F SYST BP GE 130 - 139MM HG: CPT | Mod: CPTII,,, | Performed by: INTERNAL MEDICINE

## 2024-05-23 PROCEDURE — 99215 OFFICE O/P EST HI 40 MIN: CPT | Mod: PBBFAC | Performed by: INTERNAL MEDICINE

## 2024-05-23 PROCEDURE — 3008F BODY MASS INDEX DOCD: CPT | Mod: CPTII,,, | Performed by: INTERNAL MEDICINE

## 2024-05-23 PROCEDURE — 3078F DIAST BP <80 MM HG: CPT | Mod: CPTII,,, | Performed by: INTERNAL MEDICINE

## 2024-05-23 PROCEDURE — 1159F MED LIST DOCD IN RCRD: CPT | Mod: CPTII,,, | Performed by: INTERNAL MEDICINE

## 2024-05-23 RX ORDER — ESCITALOPRAM OXALATE 10 MG/1
10 TABLET ORAL DAILY
COMMUNITY

## 2024-05-23 RX ORDER — CETIRIZINE HYDROCHLORIDE 10 MG/1
10 TABLET ORAL DAILY
COMMUNITY

## 2024-05-23 NOTE — PATIENT INSTRUCTIONS
Assessment/Plan:  Ting Ortega is a 54 y.o. female with chronic venous insufficiency s/p left venous stent placement (2019), HLD, morbid obesity, who presents for an initial appointment.    BLE Edema (L>R)- Due to BLE lymphedema and chronic venous insufficiency.  Check CT abd/pelvis with evaluate patency of LLE venous stent.  Check BLE venous reflux study.  Refer to lymphedema clinic.  Recommend wearing graduated compression hose.  Limit sodium intake to 2000 mg daily.  Limit volume intake to 1.5 L daily.  Elevate legs when resting.    2. HLD- Pt complains of leg cramps/pain with statins. Stop atorvastatin.  Continue Praluent.     3. Morbid Obesity- Refer to Bariatric Medicine for assistance with weight loss.     Follow up in 5 months

## 2024-05-23 NOTE — PROGRESS NOTES
"[facility]      Chief Complaint   Patient presents with    edema of left lower extremity       Patient ID: Ting Ortega is a 54 y.o. female with chronic venous insufficiency s/p left venous stent placement (2019), HLD, morbid obesity, who presents for an initial appointment.  Pertinent history/events are as follows:     -Pt kindly referred by Dr. Fernández for evaluation of chronic venous insufficiency.    HPI:  Mrs. Ortega reports leg swelling for several years.  States she had a stent placed in the left leg for "a clot" in 2019.  She reports occasional claudication symptoms.  No tissue loss.  Wears compression hose daily.  RICCO study 4/10/2024 revealed normal ABIs bilaterally.      Past Medical History:   Diagnosis Date    Acid reflux     Asthma     Carpal tunnel syndrome     Connective tissue disease     GERD (gastroesophageal reflux disease)     High cholesterol      Past Surgical History:   Procedure Laterality Date    COLONOSCOPY N/A 5/21/2021    Procedure: COLONOSCOPY;  Surgeon: Lashon Giraldo MD;  Location: [facility];  Service: Endoscopy;  Laterality: N/A;  Rapid on arrival    ROTATOR CUFF REPAIR Right     stent left leg      TUBAL LIGATION       Social History     Socioeconomic History    Marital status: Single    Number of children: 4    Years of education: 13   Tobacco Use    Smoking status: Never    Smokeless tobacco: Never   Substance and Sexual Activity    Alcohol use: No    Drug use: No    Sexual activity: Yes     Partners: Male     Social Determinants of Health     Financial Resource Strain: Low Risk  (5/23/2024)    Overall Financial Resource Strain (CARDIA)     Difficulty of Paying Living Expenses: Not hard at all   Food Insecurity: No Food Insecurity (5/23/2024)    Hunger Vital Sign     Worried About Running Out of Food in the Last Year: Never true     Ran Out of Food in the Last Year: Never true   Physical Activity: Insufficiently Active (5/23/2024)    Exercise Vital Sign "     Days of Exercise per Week: 2 days     Minutes of Exercise per Session: 20 min   Stress: No Stress Concern Present (5/23/2024)    [employer] - Occupational Stress Questionnaire     Feeling of Stress : Only a little   Housing Stability: Unknown (5/23/2024)    Housing Stability Vital Sign     Unable to Pay for Housing in the Last Year: No     Family History   Problem Relation Name Age of Onset    Heart attack Mother      Heart attack Father      Stroke Father      Diabetes Father      No Known Problems Paternal Grandmother      No Known Problems Paternal Grandfather      No Known Problems Maternal Grandmother      No Known Problems Maternal Grandfather         Review of patient's allergies indicates:   Allergen Reactions    Codeine Itching    Hydrocodone-guaifenesin Itching       Medication List with Changes/Refills   Current Medications    ALBUTEROL (PROVENTIL/VENTOLIN HFA) 90 MCG/ACTUATION INHALER    2 puffs every 4 to 6 hours as needed.    ALIROCUMAB (PRALUENT PEN) 150 MG/ML PNIJ    Inject 2 mLs (300 mg total) into the skin every 30 days.    ASPIRIN (ECOTRIN) 81 MG EC TABLET    Take 81 mg by mouth once daily.    ATORVASTATIN (LIPITOR) 80 MG TABLET    Take 1 tablet (80 mg total) by mouth every evening.    CETIRIZINE (ZYRTEC) 10 MG TABLET    Take 10 mg by mouth once daily.    CLOPIDOGREL (PLAVIX) 75 MG TABLET    Take 1 tablet (75 mg total) by mouth once daily.    DICLOFENAC SODIUM (VOLTAREN) 1 % GEL    APPLY 2 GRAMS EXTERNALLY FOUR TIMES A DAY AS NEEDED FOR PAIN    ESCITALOPRAM OXALATE (LEXAPRO) 10 MG TABLET    Take 10 mg by mouth once daily. Pt states take 1 tablet daily    MELOXICAM (MOBIC) 15 MG TABLET    Take 15 mg by mouth daily as needed.    MUPIROCIN (BACTROBAN) 2 % OINTMENT    APPLY SMALL AMOUNT TOPICALLY TO THE AFFECTED AREA THREE TIMES DAILY FOR 10 DAYS    ONETOUCH DELICA PLUS LANCET 30 GAUGE MISC    USE 1 TO CHECK GLUCOSE ONCE DAILY    ONETOUCH ULTRA BLUE TEST STRIP STRP     "USE TO CHECK BLOOD SUGAR DAILY AND AS NEEDED    ONETOUCH ULTRA2 KIT        PANTOPRAZOLE (PROTONIX) 40 MG TABLET    Take 40 mg by mouth once daily.    POTASSIUM CHLORIDE SA (K-DUR,KLOR-CON) 20 MEQ TABLET    Take 20 mEq by mouth once daily.    TORSEMIDE (DEMADEX) 20 MG TAB    Take 2 tablets (40 mg total) by mouth once daily.       Review of Systems  Constitution: Denies chills, fever, and sweats.  HENT: Denies headaches or blurry vision.  Cardiovascular: Denies chest pain or irregular heart beat.  Respiratory: Denies cough or shortness of breath.  Gastrointestinal: Denies abdominal pain, nausea, or vomiting.  Musculoskeletal: Positive for leg swelling.  Neurological: Denies dizziness or focal weakness.  Psychiatric/Behavioral: Normal mental status.  Hematologic/Lymphatic: Denies bleeding problem or easy bruising/bleeding.  Skin: Denies rash or suspicious lesions    Physical Examination  /65   Pulse 60   Ht 5' 2" (1.575 m)   Wt 109.3 kg (240 lb 15.4 oz)   LMP 12/09/2014 (Exact Date)   BMI 44.07 kg/m²     Constitutional: No acute distress, conversant  HEENT: Sclera anicteric, Pupils equal, round and reactive to light, extraocular motions intact, Oropharynx clear  Neck: No JVD, no carotid bruits  Cardiovascular: regular rate and rhythm, no murmur, rubs or gallops, normal S1/S2  Pulmonary: Clear to auscultation bilaterally  Abdominal: Abdomen soft, nontender, nondistended, positive bowel sounds  Extremities: BLE's with 1 pitting edema and changes consistent with lymphedema (L>R)   Pulses:  Carotid pulses are 2+ on the right side, and 2+ on the left side.  Radial pulses are 2+ on the right side, and 2+ on the left side.   Femoral pulses are 2+ on the right side, and 2+ on the left side.  Popliteal pulses are 2+ on the right side, and 2+ on the left side.   Dorsalis pedis pulses are 2+ on the right side, and 2+ on the left side.   Posterior tibial pulses are 2+ on the right side, and 2+ on the left side.    Skin: " "No ecchymosis, erythema, or ulcers  Psych: Alert and oriented x 3, appropriate affect  Neuro: CNII-XII intact, no focal deficits    Labs:  Most Recent Data  CBC:   Lab Results   Component Value Date    WBC 7.49 04/15/2024    HGB 12.0 04/15/2024    HCT 39.2 04/15/2024     04/15/2024    MCV 89 04/15/2024    RDW 14.0 04/15/2024     BMP:   Lab Results   Component Value Date     04/15/2024    K 4.0 04/15/2024     04/15/2024    CO2 27 04/15/2024    BUN 14 04/15/2024    CREATININE 0.9 04/15/2024    GLU 70 04/15/2024    CALCIUM 9.2 04/15/2024    MG 2.1 04/15/2024    PHOS 2.6 (L) 04/15/2024     LFTS;   Lab Results   Component Value Date    PROT 7.6 04/15/2024    ALBUMIN 3.9 04/15/2024    BILITOT 0.3 04/15/2024    AST 17 04/15/2024    ALKPHOS 73 04/15/2024    ALT 18 04/15/2024     COAGS: No results found for: "INR", "PROTIME", "PTT"  FLP:   Lab Results   Component Value Date    CHOL 192 10/19/2023    HDL 50 10/19/2023    LDLCALC 125.8 10/19/2023    TRIG 81 10/19/2023    CHOLHDL 26.0 10/19/2023     CARDIAC:   Lab Results   Component Value Date    BNP 23 04/15/2024       Imaging:    RICCO study 4/10/2024:  The calculated left RICCO is 1.32 which is normal  The corresponding doppler waveform and PVR are compatible with this finding.     The calculated right RICCO is 1.24 which is normal  The corresponding doppler waveform and PVR are compatible with this finding.    Assessment/Plan:  Ting Ortega is a 54 y.o. female with chronic venous insufficiency s/p left venous stent placement (2019), HLD, morbid obesity, who presents for an initial appointment.    BLE Edema (L>R)- Due to BLE lymphedema and chronic venous insufficiency.  Check CT abd/pelvis with evaluate patency of LLE venous stent.  Check BLE venous reflux study.  Refer to lymphedema clinic.  Recommend wearing graduated compression hose.  Limit sodium intake to 2000 mg daily.  Limit volume intake to 1.5 L daily.  Elevate legs when resting.    2. HLD- Pt " complains of leg cramps/pain with statins. Stop atorvastatin.  Continue Praluent.     3. Morbid Obesity- Refer to Bariatric Medicine for assistance with weight loss.     Follow up in 5 months    Total duration of face to face visit time 30 minutes.  Total time spent counseling greater than fifty percent of total visit time.  Counseling included discussion regarding imaging findings, diagnosis, possibilities, treatment options, risks and benefits.  The patient had many questions regarding the options and long-term effects.    Jim Christiansen MD, PhD  Interventional Cardiology

## 2024-05-31 ENCOUNTER — HOSPITAL ENCOUNTER (OUTPATIENT)
Dept: RADIOLOGY | Facility: HOSPITAL | Age: 54
Discharge: HOME | End: 2024-05-31
Attending: INTERNAL MEDICINE
Payer: MEDICAID

## 2024-05-31 ENCOUNTER — HOSPITAL ENCOUNTER (OUTPATIENT)
Dept: CARDIOLOGY | Facility: HOSPITAL | Age: 54
Discharge: HOME | End: 2024-05-31
Attending: INTERNAL MEDICINE
Payer: MEDICAID

## 2024-05-31 DIAGNOSIS — R10.32 LEFT LOWER QUADRANT PAIN: ICD-10-CM

## 2024-05-31 DIAGNOSIS — Z95.820 S/P PERIPHERAL ARTERY ANGIOPLASTY WITH STENT PLACEMENT: ICD-10-CM

## 2024-05-31 LAB
LEFT ANT TIBIAL SYS PSV: 54 CM/S
LEFT CFA PSV: 104 CM/S
LEFT EXTERNAL ILIAC PSV: 110 CM/S
LEFT PERONEAL SYS PSV: 36 CM/S
LEFT POPLITEAL PSV: 75 CM/S
LEFT POST TIBIAL SYS PSV: 56 CM/S
LEFT PROFUNDA SYS PSV: 88 CM/S
LEFT SUPER FEMORAL DIST SYS PSV: 49 CM/S
LEFT SUPER FEMORAL MID SYS PSV: 85 CM/S
LEFT SUPER FEMORAL OSTIAL SYS PSV: 93 CM/S
LEFT SUPER FEMORAL PROX SYS PSV: 84 CM/S
LEFT TIB/PER TRUNK SYS PSV: 67 CM/S
OHS CV LEFT LOWER EXTREMITY ABI (NO CALC): 1.34
OHS CV RIGHT ABI LOWER EXTREMITY (NO CALC): 1.36
RIGHT ANT TIBIAL SYS PSV: 41 CM/S
RIGHT CFA PSV: 87 CM/S
RIGHT EXTERNAL ILLIAC PSV: 121 CM/S
RIGHT PERONEAL SYS PSV: 77 CM/S
RIGHT POPLITEAL PSV: 44 CM/S
RIGHT POST TIBIAL SYS PSV: 114 CM/S
RIGHT PROFUNDA SYS PSV: 79 CM/S
RIGHT SUPER FEMORAL DIST SYS PSV: 40 CM/S
RIGHT SUPER FEMORAL MID SYS PSV: 78 CM/S
RIGHT SUPER FEMORAL OSTIAL SYS PSV: 85 CM/S
RIGHT SUPER FEMORAL PROX SYS PSV: 83 CM/S
RIGHT TIB/PER TRUNK SYS PSV: 52 CM/S

## 2024-05-31 PROCEDURE — A9698 NON-RAD CONTRAST MATERIALNOC: HCPCS | Performed by: INTERNAL MEDICINE

## 2024-05-31 PROCEDURE — 74177 CT ABD & PELVIS W/CONTRAST: CPT | Mod: 26,,, | Performed by: RADIOLOGY

## 2024-05-31 PROCEDURE — 93925 LOWER EXTREMITY STUDY: CPT

## 2024-05-31 PROCEDURE — 93925 LOWER EXTREMITY STUDY: CPT | Mod: 26,,, | Performed by: INTERNAL MEDICINE

## 2024-05-31 PROCEDURE — 74177 CT ABD & PELVIS W/CONTRAST: CPT | Mod: TC

## 2024-05-31 PROCEDURE — 25500020 PHARM REV CODE 255: Performed by: INTERNAL MEDICINE

## 2024-05-31 RX ADMIN — BARIUM SULFATE 450 ML: 20 SUSPENSION ORAL at 01:05

## 2024-05-31 RX ADMIN — IOHEXOL 100 ML: 350 INJECTION, SOLUTION INTRAVENOUS at 02:05

## 2024-06-03 NOTE — PROGRESS NOTES
Lymphedema Pump Progress:  [x] Order, clinical notes, and face sheet faxed to [employer] for home pneumatic compression.  [] Reached out to patient for follow up. Wish to inquire about symptoms of lymphedema and if conservative therapy has been working.  [] Updated progress note sent to [facility].  [] Patient pneumatic compression pump approved and shipped by [employer].

## 2024-06-17 ENCOUNTER — CLINICAL SUPPORT (OUTPATIENT)
Dept: REHABILITATION | Facility: HOSPITAL | Age: 54
End: 2024-06-17
Payer: MEDICAID

## 2024-06-17 ENCOUNTER — HOSPITAL ENCOUNTER (OUTPATIENT)
Dept: CARDIOLOGY | Facility: HOSPITAL | Age: 54
Discharge: HOME | End: 2024-06-17
Attending: INTERNAL MEDICINE
Payer: MEDICAID

## 2024-06-17 DIAGNOSIS — E11.9 TYPE 2 DIABETES MELLITUS WITHOUT COMPLICATION, WITH LONG-TERM CURRENT USE OF INSULIN: ICD-10-CM

## 2024-06-17 DIAGNOSIS — I87.2 CHRONIC VENOUS INSUFFICIENCY: ICD-10-CM

## 2024-06-17 DIAGNOSIS — Z79.4 TYPE 2 DIABETES MELLITUS WITHOUT COMPLICATION, WITH LONG-TERM CURRENT USE OF INSULIN: ICD-10-CM

## 2024-06-17 DIAGNOSIS — R60.0 EDEMA OF LEFT LOWER EXTREMITY: ICD-10-CM

## 2024-06-17 DIAGNOSIS — I89.0 LYMPHEDEMA OF BOTH LOWER EXTREMITIES: ICD-10-CM

## 2024-06-17 DIAGNOSIS — R60.0 BILATERAL EDEMA OF LOWER EXTREMITY: Primary | ICD-10-CM

## 2024-06-17 DIAGNOSIS — I73.9 PAD (PERIPHERAL ARTERY DISEASE): ICD-10-CM

## 2024-06-17 LAB
LEFT GREAT SAPHENOUS DISTAL THIGH DIA: 0.3 CM
LEFT GREAT SAPHENOUS JUNCTION DIA: 0.96 CM
LEFT GREAT SAPHENOUS KNEE DIA: 0.39 CM
LEFT GREAT SAPHENOUS MIDDLE THIGH DIA: 0.43 CM
LEFT GREAT SAPHENOUS MIDDLE THIGH REFLUX: 816 MS
LEFT GREAT SAPHENOUS PROXIMAL CALF DIA: 0.17 CM
LEFT SMALL SAPHENOUS KNEE DIA: 0.53 CM
LEFT SMALL SAPHENOUS KNEE REFLUX: 3591 MS
LEFT SMALL SAPHENOUS SPJ DIA: 0.69 CM
RIGHT GREAT SAPHENOUS DISTAL THIGH DIA: 0.25 CM
RIGHT GREAT SAPHENOUS JUNCTION DIA: 0.72 CM
RIGHT GREAT SAPHENOUS KNEE DIA: 0.17 CM
RIGHT GREAT SAPHENOUS MIDDLE THIGH DIA: 0.52 CM
RIGHT GREAT SAPHENOUS MIDDLE THIGH REFLUX: 3203 MS
RIGHT GREAT SAPHENOUS PROXIMAL CALF DIA: 0.2 CM
RIGHT SMALL SAPHENOUS KNEE DIA: 0.28 CM
RIGHT SMALL SAPHENOUS KNEE REFLUX: 2369 MS
RIGHT SMALL SAPHENOUS SPJ DIA: 0.63 CM

## 2024-06-17 PROCEDURE — 93970 EXTREMITY STUDY: CPT | Mod: TC

## 2024-06-17 PROCEDURE — 97110 THERAPEUTIC EXERCISES: CPT

## 2024-06-17 PROCEDURE — 93970 EXTREMITY STUDY: CPT | Mod: 26,,, | Performed by: INTERNAL MEDICINE

## 2024-06-17 PROCEDURE — 97162 PT EVAL MOD COMPLEX 30 MIN: CPT

## 2024-06-17 NOTE — PLAN OF CARE
[facility]  Physical Therapy Initial Evaluation    Name: Ting Ortega  Clinic Number: [id number]    Therapy Diagnosis:   Encounter Diagnoses   Name Primary?    Lymphedema of both lower extremities     Bilateral edema of lower extremity Yes    Chronic venous insufficiency     Type 2 diabetes mellitus without complication, with long-term current use of insulin     PAD (peripheral artery disease)     Edema of left lower extremity      Physician: Jim Christiansen MD*    Physician Orders: PT Eval and Treat - lymphedema  Medical Diagnosis from Referral:   Diagnosis   I89.0 (ICD-10-CM) - Lymphedema of both lower extremities   Evaluation Date: 6/17/2024  Authorization Period Expiration: 5/23/25  Plan of Care Expiration: 9/10/24  Visit # / Visits authorized: 1/ 1    Time In: 1110a  Time Out: 1215p  Total Billable Time: 65 minutes    Precautions: Standard, Diabetes, and cardiac    Subjective   Date of onset: swelling for several years- admits swelling before pregnancy but worsened after her last child ~ 20 years ago. Past procedures for L venous stent.  Pt was living in [facility] and now lives in [place].    History of current condition - Ting reports: wears KH compression socks daily. Pt admits home pump consult for tomorrow per Dr. Christiansen.  Pt did have a pump system in [place] - not used in over a year. Lost her sister from DVT/PE this year.  Vein testing today at [facility]. Lives in [place] ~ 1 hour + drive to reach this location.   Chart notes show artery vs venous stent- unclear.   Travel time to clinic ~ 1 1/2 hours each way!  Pt has US [facility] at [facility]  6/17/24:  Reason for Exam  Priority: Routine  Dx: Chronic venous insufficiency [I87.2 (ICD-10-CM)]     Interpretation Summary       There is no evidence of a right lower extremity DVT.    The right greater saphenous vein has reflux.    The right popliteal vein is has reflux.    The right lesser saphenous vein has  "reflux.    There is no evidence of a left lower extremity DVT.    The left greater saphenous vein has reflux.    The left smaller saphenous vein has reflux.       Pt denies CHF, KF, denies DM- chart shows DM II, denies CA.   Fluid pill- yes  Blood thinner- Plavix- lifetime    Imaging: US Impression:   No evidence of deep venous thrombosis in either lower extremity.   Additional images in the area of concern indicated by the patient along the lateral left lower leg shows subcutaneous soft tissue edema with no focal drainable fluid collection detected.      Electronically signed by:Margarita Cantor MD  Date:                                            07/03/2023:   Impression:   No deep venous thrombosis of the left lower extremity as described above.   Electronically signed by:Hardik Benz MD  Date:                                            04/15/2024      Dr. Christiansen 5/23/24:  Ting Ortega is a 54 y.o. female with chronic venous insufficiency s/p left venous stent placement (2019), HLD, morbid obesity, who presents for an initial appointment.  Pertinent history/events are as follows:    -Pt kindly referred by Dr. Fernández for evaluation of chronic venous insufficiency.   HPI:  Mrs. Ortega reports leg swelling for several years.  States she had a stent placed in the left leg for "a clot" in 2019.  She reports occasional claudication symptoms.  No tissue loss.  Wears compression hose daily.  RICCO study 4/10/2024 revealed normal ABIs bilaterally.      Medical History:   Past Medical History:   Diagnosis Date    Acid reflux     Asthma     Carpal tunnel syndrome     Connective tissue disease     GERD (gastroesophageal reflux disease)     High cholesterol      Surgical History:   Ting Ortega  has a past surgical history that includes Tubal ligation; Colonoscopy (N/A, 5/21/2021); stent left leg; and Rotator cuff repair (Right).    Medications:   Ting has a current medication list which includes the " following prescription(s): albuterol, praluent pen, aspirin, atorvastatin, cetirizine, clopidogrel, diclofenac sodium, escitalopram oxalate, meloxicam, mupirocin, onetouch delica plus lancet, onetouch ultra blue test strip, onetouch ultra2 meter, pantoprazole, potassium chloride sa, and torsemide.    Allergies:   Review of patient's allergies indicates:   Allergen Reactions    Codeine Itching    Hydrocodone-guaifenesin Itching      Previous Lymphedema Treatment: no, did have pump and compression  Prior Therapy: yes   Social History: lives with a friend, some driving, take turns driving   Occupation: working, [employer] - mostly standing  8- 9 hours for work   Environmental barriers: level home, 1 bedroom house, able to reach her feet, more issues with L side   Abuse/Neglect: none noted    Nutritional status: BMI 44   Educational needs: met   Spiritual/Cultural: met   Fall risk: denies falls     Prior Level of Function: MOD I  Current Level of Function: MOD I  Gait: amb no device    Transfers: I   Bed Mobility: I     Pain and Swelling:  Current 0/10, worst 5/10, best 0/10   Location: bilateral legs L > R, hard to walk R at times   Description: feels heavy  Aggravating Factors: just there, working standing  Easing Factors: elevation and some cramps at times    Pts goals: manage leg swelling, admits works on her feet, understands travel and location will be difficult    Objective       Female amb to  dept no device, KH 20-30mmHg socks B LE, slightly high to knee crease, high top tennis shoes  Amount of Swelling/Location of Swelling: L > R mod ankle to lower leg, obesity and body habitus to thighs   Skin Integrity: intact, shiny taut   Palpation/Texture: dense firm tautness, venous changes B LE L > R, no pitting   + L Stemmer Sign  - B Libby's Sign  Circulation: intact  Notes soreness R 2nd toe     Posture: wfl     Range of Motion - LE  Arom knee, ankle sitting or supine  (R)  + pain in knee,pulling DF 5  (L)   DF 0    Strength: functional screen   Able to perform AROM against gravity and sit to stand transfers    Sensation: intact to lt touch B LE    Girth Measurements (in centimeters)  LANDMARK LEFT LE  6/17/24 RIGHT LE  6/17/24 DIFF   at eval   SBP 51.0 cm 50.0 cm 1.0 cm   10 below SBP 44.5 cm 43.0 cm 1.5 cm   20 below SBP 42.0 cm 37.5 cm 4.5 cm   30 below SBP 33.0 cm 27.0 cm 6.0 cm   35 below SBP 29.0 cm 24.0 cm 5.0 cm   Ankle 29.5 cm 26.5 cm 3.0 cm   Forefoot 24.5 cm 24.0 cm 0.5 cm     CMS Impairment/Limitation/Restriction for FOTO Survey  Therapist reviewed FOTO scores for Ting Ortega on 6/17/2024.   FOTO documents entered into [email or web address] - see Media section.     TREATMENT   Treatment Time In: 1140a  Treatment Time Out: 1215p  Total Treatment time separate from Evaluation: 35 minutes    Ting received therapeutic exercises to develop strength, endurance, ROM, flexibility, and posture for 15 minutes including:  Pt was instructed in and performed Gastroc Soleus Stretching for ankle ROM, muscle pump support and ankle pump mobility.  Pt was given written/illustrated home exercise program to perform daily.  GSS with strap or towel in long sitting - 3 x 20 seconds 1x daily each leg  2.   GSS with strap or towel in chair with cueing for long spine and lean forward - 3 x 20 seconds 1x daily each leg  3.   GSS in standing with hands on wall- back heel remains on floor with knee straight and lean towards wall- hold for a steady stretch - 3 x 20 seconds 1x daily each leg.    Aps, knee ROM, avoid dependency, avoid immobility, elevation, walking with compression, deep breathing, use of muscle pump to assist venous return    Ting received the following manual therapy techniques: Manual Lymphatic Drainage and compression with plan of care training were applied to the: B LE for 15 minutes, including:  Education and training in compression needs.  Complete Decongestive Therapy components and management with goals and plan of care  review.    Demo of Manual Lymphatic Drainage and short stretch compression bandaging.     Pt was provided with an option for Knee High Copper Compression socks 20-30mmHg size L/XL- need to fold down top length slightly to avoid knee crease.  Pros/cons of basic compression choices were fully reviewed with noted limited size selection, one length choice, lessor tier of product, benefits of low cost, relative ease of application and comfort, and additional items available for use.  Pt was trained on don/doff and methods to apply with assistance of gloves or family member.  Position and fit with appropriate girth, length and support are required.   Daytime use with removal for sleep.   Wear schedules and wash schedules confirmed.   Provided information to secure additional pairs or use as temporary option until medical grade choices are available.     Pt admits several pairs of compression - trained in don/doff, position and fit.      Self Care/Home Management / Functional Therapeutic Activity training for 5 minutes including:  DM foot care  precautions  Compression support needed    Advised 3 hour travel is not beneficial for LE edema and not likely an option to be followed regularly.  Pt chooses compression management plan.     Present compression: KH socks - unclear of class, issued KH 20-30mmHg Socks L/XL - adjusted for length.  Pt was educated in potential compression needs.  Demo of products including socks, garments, and Inelastic Velcro wraps.   Discussed cost/coverage and authorization per insurance with Durable Medical Equipment(DME) provider.  Compression require orders from referring provider and coverage or purchase of products from DME or self order.      Discussed wear schedule, don/doff, wash and management of products.  Size and compression class and AM/PM needs.    Consideration for Copper Socks as form of temporary compression use until securing compression needs.   Consideration for shorts/tights or  capris style compression to compliment lower leg compression to support size/shape of LE.   Product information provided.   Vendor list provided.    Informed insurance coverage of compression is per DME provider and group plans may cover cost or OOP expense.     Commitment to attendance as well as commitment to securing compression needs is critical to edema management.      Home Exercises and Patient Education Provided  Education provided:   - compression daily  Travel guidance  GSS  - Pt was educated in lymphedema etiology and management plans.  Pt was provided with written risk reductions and precautions for managing lymphedema.      This patient is in agreement to participate in Lymphedema treatment.    Written Home Exercises Provided: yes.  Exercises were reviewed and Ting was able to demonstrate them prior to the end of the session.  Ting demonstrated good  understanding of the education provided.     See EMR under Patient Instructions for exercises provided 6/17/2024.    Assessment   Ting is a 54 y.o. female referred to outpatient Physical Therapy with a medical diagnosis of   Diagnosis   I89.0 (ICD-10-CM) - Lymphedema of both lower extremities   This patient presents s/p obesity, CVI, DM II per chart, PAD, L LE stent resulting in: multifactorial lymphedema of the B LE L > R, increased pain, increased stiffness in the knees and ankles, difficulty with standing for full work day, as well as difficulty performing compression selection, shoe  choice, size/shape changes of L  LE , compression needs, and placing the pt at higher risk of infection. 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 Therapy recommends elevation, exercise, regular use of compression, and therapy for at least a month to reduce swelling.  Due to pt work schedule, location and lengthy commute therapy will focus on home management plan and compression choices.     Pt prognosis is Good.   Pt will benefit from  skilled outpatient Physical Therapy to address the deficits stated above and in the chart below, provide pt/family education, and to maximize pt's level of independence.     Plan of care discussed with patient: Yes  Pt's spiritual, cultural and educational needs considered and patient is agreeable to the plan of care and goals as stated below:     Medical Necessity is demonstrated by the following  History  Co-morbidities and personal factors that may impact the plan of care [] LOW: no personal factors / co-morbidities  [x] MODERATE: 1-2 personal factors / co-morbidities  [] HIGH: 3+ personal factors / co-morbidities    Moderate / High Support Documentation:  obesity, CVI, DM II per chart, PAD, L LE stent, multifactorial lymphedema of the B LE L > R     Examination  Body Structures and Functions, activity limitations and participation restrictions that may impact the plan of care [] LOW: addressing 1-2 elements  [x] MODERATE: 3+ elements  [] HIGH: 4+ elements (please support below)    Moderate / High Support Documentation: ncreased pain, increased stiffness in the knees and ankles, difficulty with standing for full work day, hyperpigmentation, hypertrophy, cramping     Clinical Presentation [] LOW: stable  [x] MODERATE: Evolving  [] HIGH: Unstable     Decision Making/ Complexity Score: moderate       Anticipated Barriers for therapy: location, distance, attendance    The following goals were discussed with the patient and patient is in agreement with them as to be addressed in the treatment plan.     Short Term Goals: (6 weeks)  1. Patient will show decreased girth in L LE by up to 1 cm to allow for LE symmetry, shoe and clothing choice, and ability to apply needed compression.  (progressing, not met)   2. Patient will demonstrate 100% knowledge of lymphedema precautions and signs of infection to allow for reduced lymphedema risk, infection risk, and/or exacerbation of condition.  (progressing, not met)  3. Patient  or caregiver will perform self-bandaging techniques and/or wearing of compression garments to allow for lymphatic drainage support, skin elasticity, and reduction in shape and size of limb. (progressing, not met)  4. Patient will perform self lymph drainage techniques to areas within reach to enhance lymphatic drainage and skin condition.  (progressing, not met)  5. Patient will tolerate daily activities with multilayered bandaging to allow for lymphatic and venous support.  (progressing, not met)    Long Term Goals: (12  weeks)  1. Patient will show decreased girth in L LE by up to 2 cm  to allow for LE symmetry, shoe and clothing choice, and ability to apply needed compression daily.  (progressing, not met)  2. Patient will show reduction in density to mild or less with improved contour of limb to allow for cosmesis, LE symmetry, infection risk reduction, and clothing and compression choice.   (progressing, not met)  3. Patient to mitchell/doff compression garment with daily compliance to assist in lymphedema management, skin elasticity, and tissue density.  (progressing, not met)  4. Pt to show improved postural awareness and alignment.  (progressing, not met)  5. Pt to be I and compliant with HEP to allow for increased function in affected limb.   (progressing, not met)  Plan   Plan of care Certification: 6/17/2024 to 9/10/24.    Outpatient Physical Therapy 1 time every 2-3 weeks for 10 weeks to include the following interventions: Patient Education, Self Care, Therapeutic Activities, and Therapeutic Exercise. Complete Decongestive Therapy- compression and home equipment needs to be addressed and assisted.  Therapy Track: MAINTENANCE AND PREVENTION   Interventions: compression garments, therapeutic exercise, self manual lymphatic drainage training, home exercise programming.    Pt may be seen by a PTA as part of the Rehab treatment team.  Plan of Care was discussed with Diane Stack, PTA    Any Cantu, PT

## 2024-06-19 ENCOUNTER — DOCUMENTATION ONLY (OUTPATIENT)
Dept: CARDIOLOGY | Facility: CLINIC | Age: 54
End: 2024-06-19
Payer: MEDICAID

## 2024-06-19 ENCOUNTER — TELEPHONE (OUTPATIENT)
Dept: CARDIOLOGY | Facility: CLINIC | Age: 54
End: 2024-06-19
Payer: MEDICAID

## 2024-06-19 DIAGNOSIS — I89.0 LYMPHEDEMA OF BOTH LOWER EXTREMITIES: Primary | ICD-10-CM

## 2024-06-19 NOTE — TELEPHONE ENCOUNTER
Lymphedema Pump Progress:  [x] Order, clinical notes, and face sheet faxed to [employer] for home pneumatic compression.  [x] Reached out to patient for follow up. Wish to inquire about symptoms of lymphedema and if conservative therapy has been working.  [x] Updated progress note sent to [facility].  [] Patient pneumatic compression pump approved and shipped by [employer].

## 2024-06-19 NOTE — TELEPHONE ENCOUNTER
----- Message from Any Cantu PT sent at 6/19/2024  2:39 PM CDT -----  Pt was evaluated 6/17/24- she lives in [place] ~ 1.5 hours away.  We will follow every 2-3 weeks to assist in compression and  home management plans.     Please consider orders    B Knee High 20-30mmHg compression garments    thanks

## 2024-06-19 NOTE — PROGRESS NOTES
I89.0 lymphedema not elsewhere classified secondary to CVI. Patient has been compliant with compression of 20-30mmhg, elevation and exercise for at least 4 weeks yet swelling persists. Hyperplasia is present with this edema.

## 2024-06-19 NOTE — TELEPHONE ENCOUNTER
Lymphedema Pump Progress:  [x] Order, clinical notes, and face sheet faxed to [employer] for home pneumatic compression.  [x] Reached out to patient for follow up. Wish to inquire about symptoms of lymphedema and if conservative therapy has been working.  [] Updated progress note sent to [facility].  [] Patient pneumatic compression pump approved and shipped by [employer].

## 2024-07-01 ENCOUNTER — TELEPHONE (OUTPATIENT)
Dept: CARDIOLOGY | Facility: CLINIC | Age: 54
End: 2024-07-01
Payer: MEDICAID

## 2024-07-01 NOTE — TELEPHONE ENCOUNTER
----- Message from Jim Christiansen MD PhD sent at 7/1/2024  8:22 AM CDT -----  Regarding: RE: PAD  Yes. Thank you.  ----- Message -----  From: Mara Samuels RN  Sent: 7/1/2024  12:00 AM CDT  To: Mara Samuels RN; #  Subject: PAD                                              Is it ok for Ms. Ortega to get lymphedema pump given her history of PAD?

## 2024-07-03 ENCOUNTER — CLINICAL SUPPORT (OUTPATIENT)
Dept: REHABILITATION | Facility: HOSPITAL | Age: 54
End: 2024-07-03
Payer: MEDICAID

## 2024-07-03 DIAGNOSIS — R60.0 BILATERAL EDEMA OF LOWER EXTREMITY: ICD-10-CM

## 2024-07-03 DIAGNOSIS — I89.0 LYMPHEDEMA OF BOTH LOWER EXTREMITIES: Primary | ICD-10-CM

## 2024-07-03 DIAGNOSIS — Z79.4 TYPE 2 DIABETES MELLITUS WITHOUT COMPLICATION, WITH LONG-TERM CURRENT USE OF INSULIN: ICD-10-CM

## 2024-07-03 DIAGNOSIS — E11.9 TYPE 2 DIABETES MELLITUS WITHOUT COMPLICATION, WITH LONG-TERM CURRENT USE OF INSULIN: ICD-10-CM

## 2024-07-03 DIAGNOSIS — I87.2 CHRONIC VENOUS INSUFFICIENCY: ICD-10-CM

## 2024-07-03 DIAGNOSIS — I73.9 PAD (PERIPHERAL ARTERY DISEASE): ICD-10-CM

## 2024-07-03 DIAGNOSIS — R60.0 EDEMA OF LEFT LOWER EXTREMITY: ICD-10-CM

## 2024-07-03 PROCEDURE — 97110 THERAPEUTIC EXERCISES: CPT

## 2024-07-03 NOTE — PROGRESS NOTES
Physical Therapy Daily Treatment Note     Name: Ting Ortega  [facility] Number: [id number]    Therapy Diagnosis:   Encounter Diagnoses   Name Primary?    Lymphedema of both lower extremities Yes    Bilateral edema of lower extremity     Chronic venous insufficiency     Type 2 diabetes mellitus without complication, with long-term current use of insulin     PAD (peripheral artery disease)     Edema of left lower extremity      Physician: Jim Christiansen MD*    Visit Date: 7/3/2024    Physician Orders: PT Eval and Treat - lymphedema  Medical Diagnosis from Referral:   Diagnosis   I89.0 (ICD-10-CM) - Lymphedema of both lower extremities   Evaluation Date: 6/17/2024  Authorization Period Expiration: 8/25/2024  Plan of Care Expiration: 9/10/24  Visit # / Visits authorized: 2/8     Time In: 1115a  Time Out: 1220p  Total Billable Time: 65 minutes     Precautions: Standard, Diabetes, and cardiac  6/17/2024    Reason for Exam  Priority: Routine  Dx: Chronic venous insufficiency [I87.2 (ICD-10-CM)]     Interpretation Summary       There is no evidence of a right lower extremity DVT.    The right greater saphenous vein has reflux.    The right popliteal vein is has reflux.    The right lesser saphenous vein has reflux.    There is no evidence of a left lower extremity DVT.    The left greater saphenous vein has reflux.    The left smaller saphenous vein has reflux.    Arterial studies    Reason for Exam  Priority: Routine  Dx: S/P peripheral artery angioplasty with stent placement [Z95.820 (ICD-10-CM)]     Interpretation Summary       Right resting RICCO 1.36, is normal.    Right posterior tibial artery stenosis, 50-75%.    Left resting RICCO 1.34, is normal.    Duplex ultrasound shows left lower extremity arteries with no hemodynamically significant stenosis.     Subjective     Pt reports: she did have US studies with released reports.  Pt had home pump trial, training and is awaiting delivery.  She was compliant with home  compression/exercise program.  Response to previous treatment: wearing KH compression socks daily.  Pt works 5am - 1pm - up on feet.  Pt lives ~ 1.5 hours away - limits attendance.  Focus on compression choices and home management.   Functional change: pt questions if knee drainage would be needed of joint if the same as swelling - discussed therapy explanation of swelling.  Pt has knee OA/DJD - presently not painful. Performing GSS wall stretches.  Joined a gym but hasn't attended yet. Pt is considering move to [place].  Pt may  become caregiver for her nephew who has seizures.   Pain: 0/10  Location: bilateral LE, knee pain and stiffness at times.       Objective   Female amb to  dept no device, KH 20-30mmHg socks B LE, folded down to avoid knee crease, high top tennis shoes  Amount of Swelling/Location of Swelling: L > R mod ankle to lower leg, obesity and body habitus to thighs   Skin Integrity: intact, shiny taut   Palpation/Texture: dense firm tautness, venous changes B LE L > R, no pitting   + L Stemmer Sign  - B Libby's Sign  Circulation: intact     Treatment:   Ting received the following manual therapy techniques:- Manual Lymphatic Drainage were applied to the: L LE for 45 minutes, including: MLD and short stretch compression bandaging   Sock remained intact R LE    MANUAL LYMPHATIC DRAINAGE (MLD):    While supine with LEs elevated stimulation at terminus, along GI region, B inguinal regions, drainage of entire L LE albin lower leg, ankle, and foot with return proximally,  Use of Aquaphor/Eucerin due to dryness.   Consider self massage to abdominal areas, B inguinal areas, thigh, and remaining LE within reach.    SEQUENTIAL COMPRESSION PUMP: full leg sleeve applied to L LE  VES 5200 with default setting with distal pressures starting at 45mmHg entire LE   Simultaneous to compression supplies, compression education and training, and self management.    MULTILAYERED BANDAGING:  issued supplies and bandaged  L LE with cotton stockinette, soft cotton padding, section dorsum of foot, padding rolls ankle to knee, 2-8cm and 1- 10cm Durelast rolls foot to knee, to leave intact 12-24 hrs as tolerated, discontinue with any problems, return rolled bandages next session. Wash and wear schedules confirmed.   Modified and assisted for fit of high top tennis shoe,  prefer tennis shoes for walking safety over slippers/slides    Ting received therapeutic exercises to develop strength, endurance, ROM, flexibility, and posture for cueing for GSS program.  aps, walking, avoid dependency and immobility, support of muscle pump with compression and activity.  THERAPEUTIC EXERCISES:  Continue HEP of AROM, stretching, and postural correction.     Home Exercises Provided and Patient Education Provided:  Self Care Home Management Training/Functional Therapeutic Activity x 15 minutes  GSS  Compression socks as lessor tier- don/doff and schedules of wear.  Provided orders per Dr. Christiansen 20-30mmHg Knee high garments, reviewed process to secure with DME vendor and insurance as allowed  Per chart reviewed US studies showing reflux  MD placed home pump consult with [employer] - reviewed goals of pump support, trial and training per vendor  Daily support of compression with socks or stockings - remove for sleep  Present compression:  KH compression 20-30mmHg L/XL - does have to adjust / fold for length  Orders and recommendations: KH 20-30mmHg , home pump  PATIENT/FAMILY Education: bandaging/compression wear schedule,  HEP,  Beginning of self massage,  Self or assisted bandaging, compression options, and Risk reduction    Written Home Exercises Provided: Patient instructed to cont prior HEP.  Home exercise and compression plan of management was reviewed and Ting was able to demonstrate understanding prior to the end of the session.  Ting demonstrated good  understanding of the education provided.     Assessment   Ting is a 54 y.o. female  referred to outpatient Physical Therapy with a medical diagnosis of   Diagnosis   I89.0 (ICD-10-CM) - Lymphedema of both lower extremities   This patient presents s/p obesity, CVI, DM II per chart, PAD, L LE stent resulting in: multifactorial lymphedema of the B LE L > R, increased pain, increased stiffness in the knees and ankles, difficulty with standing for full work day, as well as difficulty performing compression selection, shoe  choice, size/shape changes of L  LE , compression needs, and placing the pt at higher risk of infection. 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 Therapy recommends elevation, exercise, regular use of compression, and therapy for at least a month to reduce swelling.  Due to pt work schedule, location and lengthy commute therapy will focus on home management plan and compression choices.     Cueing and guidance review of venous reflux and management with compression support and assist of muscle pump venous mobility.  Due to time and distance of travel primary focus is on HEP, home compression and assistance with home pump support. Pt has orders for KH garments and should proceed to a DME vendor to secure additional compression options for daily wear.  Pt has been compliant with KH socks and GSS.  Encouraged more exercise and activity to assist in dietary and lifestyle support.    Ting Is progressing well towards her goals.   Pt prognosis is Good.     Pt will continue to benefit from skilled outpatient physical therapy to address the deficits listed in the problem list box on initial evaluation, provide pt/family education and to maximize pt's level of independence in the home and community environment.     Pt's spiritual, cultural and educational needs considered and pt agreeable to plan of care and goals.  Anticipated Barriers for therapy: location, distance, attendance     The following goals were discussed with the patient and patient is in  agreement with them as to be addressed in the treatment plan.      Short Term Goals: (6 weeks)  1. Patient will show decreased girth in L LE by up to 1 cm to allow for LE symmetry, shoe and clothing choice, and ability to apply needed compression.  (progressing, not met)   2. Patient will demonstrate 100% knowledge of lymphedema precautions and signs of infection to allow for reduced lymphedema risk, infection risk, and/or exacerbation of condition.  (progressing, not met)  3. Patient or caregiver will perform self-bandaging techniques and/or wearing of compression garments to allow for lymphatic drainage support, skin elasticity, and reduction in shape and size of limb. (progressing, not met)  4. Patient will perform self lymph drainage techniques to areas within reach to enhance lymphatic drainage and skin condition.  (progressing, not met)  5. Patient will tolerate daily activities with multilayered bandaging to allow for lymphatic and venous support.  (progressing, not met)     Long Term Goals: (12  weeks)  1. Patient will show decreased girth in L LE by up to 2 cm  to allow for LE symmetry, shoe and clothing choice, and ability to apply needed compression daily.  (progressing, not met)  2. Patient will show reduction in density to mild or less with improved contour of limb to allow for cosmesis, LE symmetry, infection risk reduction, and clothing and compression choice.   (progressing, not met)  3. Patient to mitchell/doff compression garment with daily compliance to assist in lymphedema management, skin elasticity, and tissue density.  (progressing, not met)  4. Pt to show improved postural awareness and alignment.  (progressing, not met)  5. Pt to be I and compliant with HEP to allow for increased function in affected limb.   (progressing, not met)  Plan   Plan of care Certification: 6/17/2024 to 9/10/24.     Outpatient Physical Therapy 1 time every 2-3 weeks for 10 weeks to include the following interventions:  Patient Education, Self Care, Therapeutic Activities, and Therapeutic Exercise. Complete Decongestive Therapy- compression and home equipment needs to be addressed and assisted.  Therapy Track: MAINTENANCE AND PREVENTION   Interventions: compression garments, therapeutic exercise, self manual lymphatic drainage training, home exercise programming.    Due to distance next session 7/24/24     Pt may be seen by a PTA as part of the Rehab treatment team.  Plan of Care was discussed with RASHAD Krishnan, PT

## 2024-07-12 ENCOUNTER — TELEPHONE (OUTPATIENT)
Dept: CARDIOLOGY | Facility: CLINIC | Age: 54
End: 2024-07-12
Payer: MEDICAID

## 2024-07-12 NOTE — TELEPHONE ENCOUNTER
Lymphedema Pump Progress:  [x] Order, clinical notes, and face sheet faxed to [employer] for home pneumatic compression.  [x] Reached out to patient for follow up. Wish to inquire about symptoms of lymphedema and if conservative therapy has been working.  [x] Updated progress note sent to [facility].  [x] Patient pneumatic compression pump approved and shipped by [employer].

## 2024-07-24 ENCOUNTER — CLINICAL SUPPORT (OUTPATIENT)
Dept: REHABILITATION | Facility: HOSPITAL | Age: 54
End: 2024-07-24
Payer: MEDICAID

## 2024-07-24 DIAGNOSIS — Z79.4 TYPE 2 DIABETES MELLITUS WITHOUT COMPLICATION, WITH LONG-TERM CURRENT USE OF INSULIN: ICD-10-CM

## 2024-07-24 DIAGNOSIS — I89.0 LYMPHEDEMA OF BOTH LOWER EXTREMITIES: Primary | ICD-10-CM

## 2024-07-24 DIAGNOSIS — R60.0 BILATERAL EDEMA OF LOWER EXTREMITY: ICD-10-CM

## 2024-07-24 DIAGNOSIS — I87.2 CHRONIC VENOUS INSUFFICIENCY: ICD-10-CM

## 2024-07-24 DIAGNOSIS — R60.0 EDEMA OF LEFT LOWER EXTREMITY: ICD-10-CM

## 2024-07-24 DIAGNOSIS — I73.9 PAD (PERIPHERAL ARTERY DISEASE): ICD-10-CM

## 2024-07-24 DIAGNOSIS — E11.9 TYPE 2 DIABETES MELLITUS WITHOUT COMPLICATION, WITH LONG-TERM CURRENT USE OF INSULIN: ICD-10-CM

## 2024-07-24 PROCEDURE — 97110 THERAPEUTIC EXERCISES: CPT

## 2024-07-24 NOTE — PROGRESS NOTES
Physical Therapy Daily Treatment Note/Discharge Summary      Name: Ting Ortega  Clinic Number: [id number]    Therapy Diagnosis:   Encounter Diagnoses   Name Primary?    Lymphedema of both lower extremities Yes    Bilateral edema of lower extremity     Chronic venous insufficiency     Type 2 diabetes mellitus without complication, with long-term current use of insulin     PAD (peripheral artery disease)     Edema of left lower extremity      Physician: Jim Christiansen MD*    Visit Date: 7/24/2024    Physician Orders: PT Eval and Treat - lymphedema  Medical Diagnosis from Referral:   Diagnosis   I89.0 (ICD-10-CM) - Lymphedema of both lower extremities   Evaluation Date: 6/17/2024  Authorization Period Expiration: 8/25/2024  Plan of Care Expiration: 9/10/24  Visit # / Visits authorized: 3/8    Date of Last visit: 7/24/24  Total Visits Received: 3     Time In: 300p  Time Out: 345p  Total Billable Time: 45 minutes- pt requested early leave time  pump     Precautions: Standard, Diabetes, and cardiac  6/17/2024    Reason for Exam  Priority: Routine  Dx: Chronic venous insufficiency [I87.2 (ICD-10-CM)]     Interpretation Summary       There is no evidence of a right lower extremity DVT.    The right greater saphenous vein has reflux.    The right popliteal vein is has reflux.    The right lesser saphenous vein has reflux.    There is no evidence of a left lower extremity DVT.    The left greater saphenous vein has reflux.    The left smaller saphenous vein has reflux.    Arterial studies    Reason for Exam  Priority: Routine  Dx: S/P peripheral artery angioplasty with stent placement [Z95.820 (ICD-10-CM)]     Interpretation Summary       Right resting RICCO 1.36, is normal.    Right posterior tibial artery stenosis, 50-75%.    Left resting RICCO 1.34, is normal.    Duplex ultrasound shows left lower extremity arteries with no hemodynamically significant stenosis.     Subjective     Pt arrives at clinic near 1pm for  300pm visit- pt uses transportation service- lives in [place]  7/24/24 -11am was original time however pt called this morning to reschedule and accepted move to 300pm due to transportation arriving near 1100am.   During session she had communication with  - needs to finish before 400pm-  then called and said too late, service called and said  was leaving and they would arrange one way return trip with another .  Therapist confirmed with patient that she and  were aware of 300pm time prior to leaving her house- pt admits she said 230pm so they would be on time.    Session finished early 345 pm per her request.  Pt was to communicate with service for ride home.    Pt reports: she missed her 8am transportation- PT was at 11 had to change to 3, pt travels ~ 1.5 hours and uses a transportation service- pt arrived closer to 1 but therapy was unable to accommodate early due to 100 and 200 visits/patients. Sessions typically last 60 min.  Pt requested early leave time.  Therapy and patient came to agreement this location is  not accessible to her due to  location, drive times, and transportation service needs therefore chooses discharge.   She was compliant with home compression/exercise program.  Response to previous treatment: home pump was delivered - pt describes [employer] pump - vendor trained in use.  Has both leg sleeves and can perform together - states velcro closures. Admits some soreness to veins, some  cramping at times especially after work.  L  top of foot at bend can be sore.    Wears black Skechers for work, has shoe inserts and wears KH compression socks.  Pt has her new orders for KH 20-30mmHg - she states waiting till she moves to [place] for pharmacy order there.  Functional change: questions medical disability - advised to discuss with medical team.     Amb no device, pt notes legs tire easily with standing for work.   Pain: 0/10  Location: bilateral LE, knee  pain and stiffness at times.       Objective   Female amb to  dept no device, KH 20-30mmHg socks B LE, folded down to avoid knee crease, high top tennis shoes  Amount of Swelling/Location of Swelling: L > R mod ankle to lower leg, obesity and body habitus to thighs   Skin Integrity: intact, shiny taut   Palpation/Texture: dense firm tautness, venous changes B LE L > R, no pitting   + L Stemmer Sign  - B Libby's Sign  Circulation: intact  Girth Measurements (in centimeters)  LANDMARK LEFT LE  6/17/24 L LE  7/24/24 Change  L RIGHT LE  6/17/24 DIFF   at eval   SBP 51.0 cm 49.5 1.5 50.0 cm 1.0 cm   10 below SBP 44.5 cm 41.5 3.0 43.0 cm 1.5 cm   20 below SBP 42.0 cm 39.0 3.0 37.5 cm 4.5 cm   30 below SBP 33.0 cm 31.5 1.5 27.0 cm 6.0 cm   35 below SBP 29.0 cm 27.5 1.5 24.0 cm 5.0 cm   Ankle 29.5 cm 28.0 1.5 26.5 cm 3.0 cm   Forefoot 24.5 cm 23.5 1.0 24.0 cm 0.5 cm        Treatment:   Ting received the following manual therapy techniques:- Manual Lymphatic Drainage were applied to the: L LE for 35 minutes, including: MLD and short stretch compression bandaging   Sock reapplied to R LE    MANUAL LYMPHATIC DRAINAGE (MLD):    While supine with LEs elevated stimulation at terminus, along GI region, B inguinal regions, drainage of entire L LE albin lower leg, ankle, and foot with return proximally,  Use of Aquaphor/Eucerin due to dryness.   Consider self massage to abdominal areas, B inguinal areas, thigh, and remaining LE within reach.    SEQUENTIAL COMPRESSION PUMP: full leg sleeve applied to L LE  VES 5200 with default setting with distal pressures starting at 45mmHg entire LE   Simultaneous to compression supplies, compression education and training, and self management.    MULTILAYERED BANDAGING:  issued supplies and bandaged L LE with cotton stockinette, soft cotton padding, section dorsum of foot, padding rolls ankle to knee, 2-8cm and 1- 10cm Durelast rolls foot to knee, to leave intact 12-24 hrs as tolerated, discontinue  with any problems, return rolled bandages next session. Wash and wear schedules confirmed.   Modified and assisted for fit of high top tennis shoe,  prefer tennis shoes for walking safety over slippers/slides  Sock R CHELSIE Maguire received therapeutic exercises to develop strength, endurance, ROM, flexibility, and posture for cueing for GSS program.  aps, walking, avoid dependency and immobility, support of muscle pump with compression and activity.  THERAPEUTIC EXERCISES:  Continue HEP of AROM, stretching, and postural correction.     Home Exercises Provided and Patient Education Provided:  Self Care Home Management Training/Functional Therapeutic Activity x 15 minutes    Transportation issues, distance and plans to move dictate this location is not appropriate for her attendance. Pt has home pump system, HEP, and has orders for new KH 20-30mmHg compression choice.   Confirmed plan of care.   GSS  Compression socks as lessor tier- don/doff and schedules of wear.  Provided orders per Dr. Christiansen 20-30mmHg Knee high garments, reviewed process to secure with DME vendor and insurance as allowed  MD home pump consult with [employer] - use, pump delivered and vendor trained  reviewed goals of pump support, trial and training per vendor  Daily support of compression with socks or stockings - remove for sleep  Present compression:  KH compression 20-30mmHg L/XL - does have to adjust / fold for length  Orders and recommendations: KH 20-30mmHg , home pump  PATIENT/FAMILY Education: bandaging/compression wear schedule,  HEP,  Beginning of self massage,  Self or assisted bandaging, compression options, and Risk reduction    Written Home Exercises Provided: Patient instructed to cont prior HEP.  Home exercise and compression plan of management was reviewed and Ting was able to demonstrate understanding prior to the end of the session.  Ting demonstrated good  understanding of the education provided.     Assessment   Ting is a  54 y.o. female referred to outpatient Physical Therapy with a medical diagnosis of   Diagnosis   I89.0 (ICD-10-CM) - Lymphedema of both lower extremities   This patient presents s/p obesity, CVI, DM II per chart, PAD, L LE stent resulting in: multifactorial lymphedema of the B LE L > R, increased pain, increased stiffness in the knees and ankles, difficulty with standing for full work day, as well as difficulty performing compression selection, shoe  choice, size/shape changes of L  LE , compression needs, and placing the pt at higher risk of infection. 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 Therapy recommends elevation, exercise, regular use of compression, and therapy for at least a month to reduce swelling.  Due to pt work schedule, location and lengthy commute therapy will focus on home management plan and compression choices.     Unfortunately pt had transportation issues with her missing 8am  - they came to door but she didn't hear, rescheduled PT for 300pm and used transportation for travel to [facility] and her  choose to leave at 330pm prior to completing her visit.  Therapy was reducing time to complete by 345pm.  Due to these transportation conflicts, distance to therapy, and planned move to farther away- discharge was chosen with self home / care plan.  Pt was encouraged to discuss with her medical team options closer to her home or with a new team if she moves.    Pt has pump and compression - cueing and guidance on her self care/home management recommendations.   Some reductions in girth of L LE- size/shape fullness persists and requires long term support.   Pt has lessor tier  of socks at present and has  orders for new compression.  Pt will be discharged due to distance and transportation requirements.   Confirmed home program/self management plan.   Address weight and medical conditions and guidance to support venous reflux and management  with compression and assist of muscle pump venous mobility.      Due to time and distance of travel primary focus is on HEP, home compression and assistance with home pump support. Pt has orders for KH garments and should proceed to a DME vendor to secure additional compression options for daily wear.  Pt has been compliant with KH socks and GSS.  Encouraged more exercise and activity to assist in dietary and lifestyle support.    Ting Is progressing well towards her goals.   Pt prognosis is Good.     Pt's spiritual, cultural and educational needs considered and pt agreeable to plan of care and goals.  Anticipated Barriers for therapy: location, distance, attendance     The following goals were discussed with the patient and patient is in agreement with them as to be addressed in the treatment plan.      Short Term Goals: (6 weeks)  1. Patient will show decreased girth in L LE by up to 1 cm to allow for LE symmetry, shoe and clothing choice, and ability to apply needed compression.  (progressing  2. Patient will demonstrate 100% knowledge of lymphedema precautions and signs of infection to allow for reduced lymphedema risk, infection risk, and/or exacerbation of condition.  (progressing,   3. Patient or caregiver will perform self-bandaging techniques and/or wearing of compression garments to allow for lymphatic drainage support, skin elasticity, and reduction in shape and size of limb. (progressing,  4. Patient will perform self lymph drainage techniques to areas within reach to enhance lymphatic drainage and skin condition.  (progressing, not met)  5. Patient will tolerate daily activities with multilayered bandaging to allow for lymphatic and venous support.  (progressing,)     Long Term Goals: (12  weeks)  1. Patient will show decreased girth in L LE by up to 2 cm  to allow for LE symmetry, shoe and clothing choice, and ability to apply needed compression daily.  (progressing)  2. Patient will show reduction in  density to mild or less with improved contour of limb to allow for cosmesis, LE symmetry, infection risk reduction, and clothing and compression choice.   (progressing,  3. Patient to mitchell/doff compression garment with daily compliance to assist in lymphedema management, skin elasticity, and tissue density.  met)  4. Pt to show improved postural awareness and alignment.  (progressing,   5. Pt to be I and compliant with HEP to allow for increased function in affected limb.   (progressing,     Goals partially met due to early discharge.     Discharge reason: Patient has reached the maximum rehab potential for the present time, Patient requested discharge due to distance and transportation concerns  Partial goal achievement    Discharge FOTO Score: na    PLAN   This patient is discharged from Physical Therapy     Any Cantu, PT

## 2024-08-02 DIAGNOSIS — K21.9 GASTROESOPHAGEAL REFLUX DISEASE, UNSPECIFIED WHETHER ESOPHAGITIS PRESENT: Primary | ICD-10-CM

## 2024-08-16 ENCOUNTER — HOSPITAL ENCOUNTER (OUTPATIENT)
Dept: RADIOLOGY | Facility: HOSPITAL | Age: 54
Discharge: HOME | End: 2024-08-16
Attending: SURGERY
Payer: MEDICAID

## 2024-08-16 DIAGNOSIS — K21.9 GASTROESOPHAGEAL REFLUX DISEASE, UNSPECIFIED WHETHER ESOPHAGITIS PRESENT: ICD-10-CM

## 2024-08-16 PROCEDURE — A9698 NON-RAD CONTRAST MATERIALNOC: HCPCS | Performed by: SURGERY

## 2024-08-16 PROCEDURE — 25500020 PHARM REV CODE 255: Performed by: SURGERY

## 2024-08-16 PROCEDURE — 74240 X-RAY XM UPR GI TRC 1CNTRST: CPT | Mod: TC

## 2024-08-16 RX ADMIN — BARIUM SULFATE 176 G: 960 POWDER, FOR SUSPENSION ORAL at 09:08

## 2024-08-16 RX ADMIN — BARIUM SULFATE 135 ML: 980 POWDER, FOR SUSPENSION ORAL at 09:08

## 2024-09-12 ENCOUNTER — OFFICE VISIT (OUTPATIENT)
Dept: CARDIOLOGY | Facility: CLINIC | Age: 54
End: 2024-09-12
Payer: MEDICAID

## 2024-09-12 VITALS — WEIGHT: 240.94 LBS | BODY MASS INDEX: 44.34 KG/M2 | HEIGHT: 62 IN

## 2024-09-12 DIAGNOSIS — Z95.820 S/P PERIPHERAL ARTERY ANGIOPLASTY WITH STENT PLACEMENT: ICD-10-CM

## 2024-09-12 DIAGNOSIS — E66.01 CLASS 3 SEVERE OBESITY DUE TO EXCESS CALORIES WITHOUT SERIOUS COMORBIDITY WITH BODY MASS INDEX (BMI) OF 45.0 TO 49.9 IN ADULT: ICD-10-CM

## 2024-09-12 DIAGNOSIS — E78.2 MIXED HYPERLIPIDEMIA: ICD-10-CM

## 2024-09-12 DIAGNOSIS — I89.0 LYMPHEDEMA OF BOTH LOWER EXTREMITIES: ICD-10-CM

## 2024-09-12 DIAGNOSIS — I73.9 PAD (PERIPHERAL ARTERY DISEASE): ICD-10-CM

## 2024-09-12 DIAGNOSIS — I87.2 CHRONIC VENOUS INSUFFICIENCY: Primary | ICD-10-CM

## 2024-09-12 DIAGNOSIS — E11.9 TYPE 2 DIABETES MELLITUS WITHOUT COMPLICATION, WITH LONG-TERM CURRENT USE OF INSULIN: ICD-10-CM

## 2024-09-12 DIAGNOSIS — R60.0 EDEMA OF LEFT LOWER EXTREMITY: ICD-10-CM

## 2024-09-12 DIAGNOSIS — Z79.4 TYPE 2 DIABETES MELLITUS WITHOUT COMPLICATION, WITH LONG-TERM CURRENT USE OF INSULIN: ICD-10-CM

## 2024-09-12 DIAGNOSIS — R60.0 BILATERAL EDEMA OF LOWER EXTREMITY: ICD-10-CM

## 2024-09-12 DIAGNOSIS — I10 PRIMARY HYPERTENSION: ICD-10-CM

## 2024-09-12 RX ORDER — AMITRIPTYLINE HYDROCHLORIDE 25 MG/1
25 TABLET, FILM COATED ORAL NIGHTLY
COMMUNITY
Start: 2024-09-06

## 2024-09-12 RX ORDER — GABAPENTIN 100 MG/1
100 CAPSULE ORAL 3 TIMES DAILY
COMMUNITY
Start: 2024-08-31

## 2024-09-12 RX ORDER — CHOLECALCIFEROL (VITAMIN D3) 25 MCG
1000 TABLET ORAL DAILY
COMMUNITY

## 2024-09-12 RX ORDER — SUCRALFATE 1 G/1
1 TABLET ORAL 2 TIMES DAILY
COMMUNITY
Start: 2024-09-06

## 2024-09-12 NOTE — PROGRESS NOTES
"[facility]      Chief Complaint   Patient presents with    Edema of left lower extremity       Patient ID: Ting Ortega is a 54 y.o. female with chronic venous insufficiency s/p left venous stent placement (2019), PAD, HLD, morbid obesity, who presents for a follow-up appointment.  Pertinent history/events are as follows:     -Pt kindly referred by Dr. Fernández for evaluation of chronic venous insufficiency.    -At our initial clinic visit on 5/23/2024, Mrs. Ortega reported leg swelling for several years.  States she had a stent placed in the left leg for "a clot" in 2019.  She reports occasional claudication symptoms.  No tissue loss.  Wears compression hose daily.  RICCO study 4/10/2024 revealed normal ABIs bilaterally.  Plan:  BLE Edema (L>R)- Due to BLE lymphedema and chronic venous insufficiency.  Check CT abd/pelvis with evaluate patency of LLE venous stent.  Check BLE venous reflux study.  Refer to lymphedema clinic.  Recommend wearing graduated compression hose.  Limit sodium intake to 2000 mg daily.  Limit volume intake to 1.5 L daily.  Elevate legs when resting.  HLD- Pt complains of leg cramps/pain with statins. Stop atorvastatin.  Continue Praluent.   Morbid Obesity- Refer to Bariatric Medicine for assistance with weight loss.     HPI:  Mrs. Ortega reports improvement in leg swelling.  She has completed lymphedema clinic therapy.  She is now taking praluent for HLD.  BLE Venous Reflux Study on 6/17/2024 revealed hemodynamically significant bilateral lower extremity venous reflux no DVT.  BLE Arterial Ultrasound on 5/31/2024 demonstrated right resting RICCO 1.36, is normal.  Right posterior tibial artery stenosis, 50-75%. Left resting RICCO 1.34, is normal. Duplex ultrasound shows left lower extremity arteries with no hemodynamically significant stenosis.  CTA Abd/Pelvis on 5/31/2024 showed left common iliac venous stent.  Patency indeterminate.  No significant compression by the right " internal iliac artery.     Past Medical History:   Diagnosis Date    Acid reflux     Asthma     Carpal tunnel syndrome     Connective tissue disease     GERD (gastroesophageal reflux disease)     High cholesterol      Past Surgical History:   Procedure Laterality Date    COLONOSCOPY N/A 5/21/2021    Procedure: COLONOSCOPY;  Surgeon: Lashon Giraldo MD;  Location: [facility];  Service: Endoscopy;  Laterality: N/A;  Rapid on arrival    ROTATOR CUFF REPAIR Right     stent left leg      TUBAL LIGATION       Social History     Socioeconomic History    Marital status: Single    Number of children: 4    Years of education: 13   Tobacco Use    Smoking status: Never    Smokeless tobacco: Never   Substance and Sexual Activity    Alcohol use: No    Drug use: No    Sexual activity: Yes     Partners: Male     Social Determinants of Health     Financial Resource Strain: Low Risk  (5/23/2024)    Overall Financial Resource Strain (CARDIA)     Difficulty of Paying Living Expenses: Not hard at all   Food Insecurity: No Food Insecurity (5/23/2024)    Hunger Vital Sign     Worried About Running Out of Food in the Last Year: Never true     Ran Out of Food in the Last Year: Never true   Physical Activity: Insufficiently Active (5/23/2024)    Exercise Vital Sign     Days of Exercise per Week: 2 days     Minutes of Exercise per Session: 20 min   Stress: No Stress Concern Present (5/23/2024)    [employer] [place] [employer] - Occupational Stress Questionnaire     Feeling of Stress : Only a little   Housing Stability: Unknown (5/23/2024)    Housing Stability Vital Sign     Unable to Pay for Housing in the Last Year: No     Family History   Problem Relation Name Age of Onset    Heart attack Mother      Heart attack Father      Stroke Father      Diabetes Father      No Known Problems Paternal Grandmother      No Known Problems Paternal Grandfather      No Known Problems Maternal Grandmother      No Known Problems Maternal  Grandfather         Review of patient's allergies indicates:   Allergen Reactions    Codeine Itching    Hydrocodone-guaifenesin Itching       Medication List with Changes/Refills   Current Medications    ALBUTEROL (PROVENTIL/VENTOLIN HFA) 90 MCG/ACTUATION INHALER    2 puffs every 4 to 6 hours as needed.    ALIROCUMAB (PRALUENT PEN) 150 MG/ML PNIJ    Inject 2 mLs (300 mg total) into the skin every 30 days.    AMITRIPTYLINE (ELAVIL) 25 MG TABLET    Take 25 mg by mouth every evening.    ASPIRIN (ECOTRIN) 81 MG EC TABLET    Take 81 mg by mouth once daily.    ATORVASTATIN (LIPITOR) 80 MG TABLET    Take 1 tablet (80 mg total) by mouth every evening.    CETIRIZINE (ZYRTEC) 10 MG TABLET    Take 10 mg by mouth once daily.    CLOPIDOGREL (PLAVIX) 75 MG TABLET    Take 1 tablet (75 mg total) by mouth once daily.    DICLOFENAC SODIUM (VOLTAREN) 1 % GEL    APPLY 2 GRAMS EXTERNALLY FOUR TIMES A DAY AS NEEDED FOR PAIN    ESCITALOPRAM OXALATE (LEXAPRO) 10 MG TABLET    Take 10 mg by mouth once daily. Pt states take 1 tablet daily    GABAPENTIN (NEURONTIN) 100 MG CAPSULE    Take 100 mg by mouth 3 (three) times daily.    MELOXICAM (MOBIC) 15 MG TABLET    Take 15 mg by mouth daily as needed.    ONETOUCH DELICA PLUS LANCET 30 GAUGE MISC    USE 1 TO CHECK GLUCOSE ONCE DAILY    ONETOUCH ULTRA BLUE TEST STRIP STRP    USE TO CHECK BLOOD SUGAR DAILY AND AS NEEDED    ONETOUCH ULTRA2 KIT        PANTOPRAZOLE (PROTONIX) 40 MG TABLET    Take 40 mg by mouth once daily.    POTASSIUM CHLORIDE SA (K-DUR,KLOR-CON) 20 MEQ TABLET    Take 20 mEq by mouth once daily.    SUCRALFATE (CARAFATE) 1 GRAM TABLET    Take 1 g by mouth 2 (two) times daily.    TORSEMIDE (DEMADEX) 20 MG TAB    Take 2 tablets (40 mg total) by mouth once daily.    VITAMIN D (VITAMIN D3) 1000 UNITS TAB    Take 1,000 Units by mouth once daily.   Discontinued Medications    MUPIROCIN (BACTROBAN) 2 % OINTMENT    APPLY SMALL AMOUNT TOPICALLY TO THE AFFECTED AREA THREE TIMES DAILY FOR 10 DAYS  "      Review of Systems  Constitution: Denies chills, fever, and sweats.  HENT: Denies headaches or blurry vision.  Cardiovascular: Denies chest pain or irregular heart beat.  Respiratory: Denies cough or shortness of breath.  Gastrointestinal: Denies abdominal pain, nausea, or vomiting.  Musculoskeletal: Positive for leg swelling.  Neurological: Denies dizziness or focal weakness.  Psychiatric/Behavioral: Normal mental status.  Hematologic/Lymphatic: Denies bleeding problem or easy bruising/bleeding.  Skin: Denies rash or suspicious lesions    Physical Examination  Ht 5' 2" (1.575 m)   Wt 109.3 kg (240 lb 15.4 oz)   LMP 12/09/2014 (Exact Date)   BMI 44.07 kg/m²     Constitutional: No acute distress, conversant  HEENT: Sclera anicteric, Pupils equal, round and reactive to light, extraocular motions intact, Oropharynx clear  Neck: No JVD, no carotid bruits  Cardiovascular: regular rate and rhythm, no murmur, rubs or gallops, normal S1/S2  Pulmonary: Clear to auscultation bilaterally  Abdominal: Abdomen soft, nontender, nondistended, positive bowel sounds  Extremities: BLE's with 1 pitting edema and changes consistent with lymphedema (L>R)   Pulses:  Carotid pulses are 2+ on the right side, and 2+ on the left side.  Radial pulses are 2+ on the right side, and 2+ on the left side.   Femoral pulses are 2+ on the right side, and 2+ on the left side.  Popliteal pulses are 2+ on the right side, and 2+ on the left side.   Dorsalis pedis pulses are 2+ on the right side, and 2+ on the left side.   Posterior tibial pulses are 2+ on the right side, and 2+ on the left side.    Skin: No ecchymosis, erythema, or ulcers  Psych: Alert and oriented x 3, appropriate affect  Neuro: CNII-XII intact, no focal deficits    Labs:  Most Recent Data  CBC:   Lab Results   Component Value Date    WBC 7.49 04/15/2024    HGB 12.0 04/15/2024    HCT 39.2 04/15/2024     04/15/2024    MCV 89 04/15/2024    RDW 14.0 04/15/2024     BMP:   Lab " "Results   Component Value Date     04/15/2024    K 4.0 04/15/2024     04/15/2024    CO2 27 04/15/2024    BUN 14 04/15/2024    CREATININE 0.9 04/15/2024    GLU 70 04/15/2024    CALCIUM 9.2 04/15/2024    MG 2.1 04/15/2024    PHOS 2.6 (L) 04/15/2024     LFTS;   Lab Results   Component Value Date    PROT 7.6 04/15/2024    ALBUMIN 3.9 04/15/2024    BILITOT 0.3 04/15/2024    AST 17 04/15/2024    ALKPHOS 73 04/15/2024    ALT 18 04/15/2024     COAGS: No results found for: "INR", "PROTIME", "PTT"  FLP:   Lab Results   Component Value Date    CHOL 192 10/19/2023    HDL 50 10/19/2023    LDLCALC 125.8 10/19/2023    TRIG 81 10/19/2023    CHOLHDL 26.0 10/19/2023     CARDIAC:   Lab Results   Component Value Date    BNP 23 04/15/2024       Imaging:    BLE Venous Reflux Study 6/17/2024:    There is no evidence of a right lower extremity DVT.    The right greater saphenous vein has reflux.    The right popliteal vein is has reflux.    The right lesser saphenous vein has reflux.    There is no evidence of a left lower extremity DVT.    The left greater saphenous vein has reflux.    The left smaller saphenous vein has reflux.    BLE Arterial Ultrasound 5/31/2024:    Right resting RICCO 1.36, is normal.    Right posterior tibial artery stenosis, 50-75%.    Left resting RICCO 1.34, is normal.    Duplex ultrasound shows left lower extremity arteries with no hemodynamically significant stenosis.    CTA Abd/Pelvis 5/31/2024:  Left common iliac venous stent.  Patency indeterminate.  No significant compression by the right internal iliac artery.   Mild hepatomegaly with suspected steatosis.    RICCO study 4/10/2024:  The calculated left RICCO is 1.32 which is normal  The corresponding doppler waveform and PVR are compatible with this finding.   The calculated right RICCO is 1.24 which is normal  The corresponding doppler waveform and PVR are compatible with this finding.    Assessment/Plan:  Ting Ortega is a 54 y.o. female with " chronic venous insufficiency s/p left venous stent placement (2019), PAD, HLD, morbid obesity, who presents for a follow up appointment.    BLE Edema (L>R)- Due to BLE lymphedema and chronic venous insufficiency.  BLE Venous Reflux Study on 6/17/2024 revealed hemodynamically significant bilateral lower extremity venous reflux no DVT.  CTA Abd/Pelvis on 5/31/2024 showed left common iliac venous stent.  Patency indeterminate.  No significant compression by the right internal iliac artery. Continue graduated compression hose.  Limit sodium intake to 2000 mg daily.  Limit volume intake to 1.5 L daily.  Elevate legs when resting.    2. HLD- Pt complains of leg cramps/pain with statins. Stop atorvastatin. Continue Praluent.     3. Morbid Obesity- Refer to Bariatric Medicine for assistance with weight loss.     4. PAD- BLE Arterial Ultrasound on 5/31/2024 demonstrated right resting RICCO 1.36, is normal.  Right posterior tibial artery stenosis, 50-75%. Left resting RICCO 1.34, is normal. Duplex ultrasound shows left lower extremity arteries with no hemodynamically significant stenosis. Check echo prior to next visit.  Consider adding cilostazol at next visit. Pt to start a walking program with goal of at least 30 minutes a day, 5 days a week.    Follow up in 6 months with echo prior    Total duration of face to face visit time 45 minutes.  Total time spent counseling greater than fifty percent of total visit time.  Counseling included discussion regarding imaging findings, diagnosis, possibilities, treatment options, risks and benefits.  The patient had many questions regarding the options and long-term effects.    Jim Christiansen MD, PhD  Interventional Cardiology

## 2024-09-12 NOTE — PATIENT INSTRUCTIONS
Assessment/Plan:  Ting Ortega is a 54 y.o. female with chronic venous insufficiency s/p left venous stent placement (2019), PAD, HLD, morbid obesity, who presents for a follow up appointment.    BLE Edema (L>R)- Due to BLE lymphedema and chronic venous insufficiency.  BLE Venous Reflux Study on 6/17/2024 revealed hemodynamically significant bilateral lower extremity venous reflux no DVT.  CTA Abd/Pelvis on 5/31/2024 showed left common iliac venous stent.  Patency indeterminate.  No significant compression by the right internal iliac artery. Continue graduated compression hose.  Limit sodium intake to 2000 mg daily.  Limit volume intake to 1.5 L daily.  Elevate legs when resting.    2. HLD- Pt complains of leg cramps/pain with statins. Stop atorvastatin. Continue Praluent.     3. Morbid Obesity- Refer to Bariatric Medicine for assistance with weight loss.     4. PAD- BLE Arterial Ultrasound on 5/31/2024 demonstrated right resting RICCO 1.36, is normal.  Right posterior tibial artery stenosis, 50-75%. Left resting RICCO 1.34, is normal. Duplex ultrasound shows left lower extremity arteries with no hemodynamically significant stenosis. Check echo prior to next visit.  Consider adding cilostazol at next visit. Pt to start a walking program with goal of at least 30 minutes a day, 5 days a week.    Follow up in 6 months with echo prior

## 2024-09-26 ENCOUNTER — TELEPHONE (OUTPATIENT)
Dept: CARDIOLOGY | Facility: CLINIC | Age: 54
End: 2024-09-26
Payer: MEDICAID

## 2024-09-26 NOTE — TELEPHONE ENCOUNTER
----- Message from Mariangel Smith sent at 9/26/2024 10:37 AM CDT -----  Regarding: Referral  Patient calling to have her referral sent. Please call back @ [phone]/ fax [phone]. Thank you Mariangel

## 2025-04-30 PROBLEM — Z92.89 H/O CARDIOVASCULAR STRESS TEST: Status: ACTIVE | Noted: 2025-04-30

## 2025-06-25 ENCOUNTER — PATIENT MESSAGE (OUTPATIENT)
Dept: ADMINISTRATIVE | Facility: OTHER | Age: 55
End: 2025-06-25
Payer: MEDICAID